# Patient Record
Sex: MALE | Race: WHITE | NOT HISPANIC OR LATINO | Employment: OTHER | URBAN - NONMETROPOLITAN AREA
[De-identification: names, ages, dates, MRNs, and addresses within clinical notes are randomized per-mention and may not be internally consistent; named-entity substitution may affect disease eponyms.]

---

## 2022-12-25 PROBLEM — I10 ESSENTIAL HYPERTENSION: Status: ACTIVE | Noted: 2022-12-25

## 2022-12-25 PROBLEM — J18.9 MULTIFOCAL PNEUMONIA: Status: ACTIVE | Noted: 2022-12-25

## 2022-12-25 PROBLEM — J96.01 ACUTE HYPOXEMIC RESPIRATORY FAILURE (HCC): Status: ACTIVE | Noted: 2022-12-25

## 2022-12-25 PROBLEM — A41.9 SEVERE SEPSIS (HCC): Status: ACTIVE | Noted: 2022-12-25

## 2022-12-25 PROBLEM — J43.9 EMPHYSEMA LUNG (HCC): Status: ACTIVE | Noted: 2022-12-25

## 2022-12-25 PROBLEM — R65.20 SEVERE SEPSIS (HCC): Status: ACTIVE | Noted: 2022-12-25

## 2022-12-26 PROBLEM — G47.33 OSA (OBSTRUCTIVE SLEEP APNEA): Status: ACTIVE | Noted: 2022-12-26

## 2022-12-26 PROBLEM — E83.52 HYPERCALCEMIA: Status: ACTIVE | Noted: 2022-12-26

## 2022-12-26 PROBLEM — R74.01 TRANSAMINITIS: Status: ACTIVE | Noted: 2022-12-26

## 2022-12-27 PROBLEM — I25.10 CORONARY ARTERY CALCIFICATION SEEN ON CT SCAN: Status: ACTIVE | Noted: 2022-12-27

## 2023-01-11 PROBLEM — N52.9 IMPOTENCE: Status: RESOLVED | Noted: 2017-09-19 | Resolved: 2023-01-11

## 2023-01-11 PROBLEM — Z87.01 HISTORY OF RECENT PNEUMONIA: Status: ACTIVE | Noted: 2023-01-11

## 2023-01-11 PROBLEM — G47.33 OBSTRUCTIVE SLEEP APNEA: Status: ACTIVE | Noted: 2022-06-01

## 2023-02-01 ENCOUNTER — HOSPITAL ENCOUNTER (OUTPATIENT)
Dept: PULMONOLOGY | Facility: HOSPITAL | Age: 63
Discharge: HOME | End: 2023-02-01
Attending: INTERNAL MEDICINE

## 2023-02-01 DIAGNOSIS — J43.2 CENTRILOBULAR EMPHYSEMA (HCC): ICD-10-CM

## 2023-02-01 RX ORDER — ALBUTEROL SULFATE 2.5 MG/3ML
2.5 SOLUTION RESPIRATORY (INHALATION) ONCE AS NEEDED
Status: COMPLETED | OUTPATIENT
Start: 2023-02-01 | End: 2023-02-01

## 2023-02-01 RX ADMIN — ALBUTEROL SULFATE 2.5 MG: 2.5 SOLUTION RESPIRATORY (INHALATION) at 16:42

## 2023-02-03 ENCOUNTER — HOSPITAL ENCOUNTER (EMERGENCY)
Facility: HOSPITAL | Age: 63
Discharge: HOME | End: 2023-02-03
Attending: EMERGENCY MEDICINE

## 2023-02-03 VITALS
TEMPERATURE: 98.3 F | RESPIRATION RATE: 18 BRPM | OXYGEN SATURATION: 97 % | BODY MASS INDEX: 28.75 KG/M2 | HEIGHT: 72 IN | HEART RATE: 75 BPM | SYSTOLIC BLOOD PRESSURE: 130 MMHG | DIASTOLIC BLOOD PRESSURE: 94 MMHG

## 2023-02-03 DIAGNOSIS — R04.0 RIGHT-SIDED EPISTAXIS: Primary | ICD-10-CM

## 2023-02-03 RX ORDER — TRANEXAMIC ACID 100 MG/ML
500 INJECTION, SOLUTION INTRAVENOUS ONCE
Status: COMPLETED | OUTPATIENT
Start: 2023-02-03 | End: 2023-02-03

## 2023-02-03 RX ADMIN — TRANEXAMIC ACID 500 MG: 1 INJECTION, SOLUTION INTRAVENOUS at 18:05

## 2023-02-03 NOTE — ED PROVIDER NOTES
History  Chief Complaint   Patient presents with   • Nose Bleed     Has nose bleed everyday, today he couldn't get to stop  Stopped in waiting room about 10 minutes ago     50-year-old male with history of  multifocal pneumonia admitted from - concern about interstitial lung disease and rheumatoid arthritis previously on methotrexate and Medrol Dosepak which has been held  He is currently on 2 L nasal cannula chronically since hospitalization presents with complaints of nosebleeds mostly right-sided  He did this problem does precede his hospitalization but is essentially had on a daily basis since discharge  He basically has spots on Kleenex that he places in his nose he has a known history of a perforated nasal septum this was from overuse of over-the-counter nasal spray  And 1 day he noticed that a Q-tip went completely through from 1 side of the nose to the other he had 2 ENT visits he underwent a scope of his larynx they were concerned about a spot there he completed some antibiotics and then presented for his pneumonia soon after he has not been able to follow-up with ENT since  His only anticoagulant is a baby aspirin  He has been using some use of pros and but he feels like the crustiness that makes the crustiness to the soft he denies any fever or chills  There is no clots no blood running down the back of his throat it is all right-sided he did not injure his nose or bump it he feels his dyspnea on exertion and breathing is at baseline  Prior to Admission Medications   Prescriptions Last Dose Informant Patient Reported? Taking? Azelastine-Fluticasone 137-50 MCG/ACT SUSP   Yes No   Si spray into each nostril every 12 (twelve) hours   amLODIPine (NORVASC) 10 mg tablet   Yes No   Sig: Take 10 mg by mouth daily in the early morning     aspirin (ECOTRIN LOW STRENGTH) 81 mg EC tablet   No No   Sig: Take 1 tablet (81 mg total) by mouth daily Do not start before 2023  clotrimazole-betamethasone (LOTRISONE) 1-0 05 % cream   No No   Sig: Apply topically 2 (two) times a day   co-enzyme Q-10 30 MG capsule   Yes No   Sig: Take 30 mg by mouth daily after lunch   folic acid (FOLVITE) 1 mg tablet   Yes No   Sig: Take 1 mg by mouth daily   gabapentin (NEURONTIN) 300 mg capsule   No No   Sig: Take 2 capsules (600 mg total) by mouth 2 (two) times a day   ipratropium-albuterol (DUO-NEB) 0 5-2 5 mg/3 mL nebulizer solution   No No   Sig: Take 3 mL by nebulization 3 (three) times a day as needed for wheezing or shortness of breath   lisinopril (ZESTRIL) 5 mg tablet   No No   Sig: Take 1 tablet (5 mg total) by mouth daily Do not start before January 5, 2023  methotrexate 2 5 mg tablet   Yes No   Sig: Take 2 5 mg by mouth once a week    methylprednisolone (MEDROL) 4 mg tablet   Yes No   Sig: Take 4 mg by mouth daily with breakfast   mupirocin (BACTROBAN) 2 % ointment   No No   Sig: Apply topically 2 (two) times a day   omeprazole (PriLOSEC) 40 MG capsule   Yes No   Sig: Take 40 mg by mouth daily with lunch   oxygen gas   Yes No   Sig: Inhale 3 L/min continuous   Indications: Oxygen Therapy   saccharomyces boulardii (FLORASTOR) 250 mg capsule   Yes No   Sig: Take 250 mg by mouth daily at bedtime   triamcinolone (KENALOG) 0 1 % cream   No No   Sig: Apply topically 2 (two) times a day      Facility-Administered Medications: None       Past Medical History:   Diagnosis Date   • Arthritis 2003   • Bladder cancer ([place] [address] )    • BPH (benign prostatic hyperplasia)    • Cancer ([place] [address] ) 2011    Bladder cancer   • COPD (chronic obstructive pulmonary disease) ([place] [address] ) 06/2020    Obstructive sleep apnoea   • CPAP (continuous positive airway pressure) dependence    • GERD (gastroesophageal reflux disease)    • HL (hearing loss)    • Hypertension    • Nasal congestion    • Neuropathy    • Rheumatoid arthritis ([place] [address] )    • Sleep apnea    • Sleep difficulties    • Tinnitus    • Umbilical hernia        Past Surgical History:   Procedure Laterality Date   • COLONOSCOPY     • CYSTOSCOPY      with bladder biopsy and resection of large bladder tumor- Dr Radha Cochran    • CYSTOSCOPY  11/09/2012, 08/03/2012, 06/04/2013, 12/21/2015, 6/23/2016, 1/20/2017, 2/9/2018    Diagnostic- Dr Radha Cochran    • ESOPHAGOGASTRODUODENOSCOPY     • KNEE SURGERY Right    • TN ARTHRS KNE SURG W/MENISCECTOMY MED/LAT W/SHVG Left 10/19/2016    Procedure: KNEE ARTHROSCOPIC PARTIAL MEDIAL MENISCECTOMY ;  Surgeon: Uziel Che MD;  Location: [facility];  Service: Orthopedics   • TN CORRECTION HAMMERTOE Left 04/01/2022    Procedure: REPAIR HAMMERTOE RIGHT 3RD TOE;  Surgeon: Mike Jackson DPM;  Location: [address] [place] [address] MAIN OR;  Service: Podiatry   • TN EXC B9 LESION MRGN XCP SK TG T/A/L 2 1-3 0 CM Bilateral 11/01/2018    Procedure: LEFT PROXIMAL FOREARM EXCISIONAL BIOPSY OF SUBCUTANEOUS SOFT TISSUE MASS; BILATERAL OLECRANON BURSECTOMY;  Surgeon: Uziel Che MD;  Location: [facility];  Service: Orthopedics   • TN EXC NEUROMA CUTAN NRV SURGLY IDENTIFIABLE Left 04/01/2022    Procedure: [address] RIGHT 2ND INTERSPACE;  Surgeon: Mike Jackson DPM;  Location: [address] [place] [address] MAIN OR;  Service: Podiatry   • TN TRURL ELECTROSURG [address] COMPLETE N/A 10/05/2018    Procedure: TRANSURETHRAL RESECTION OF PROSTATE (TURP); Surgeon: Aracelis Alexis MD;  Location: [facility];  Service: Urology   • UMBILICAL HERNIA REPAIR     • WISDOM TOOTH EXTRACTION         Family History   Problem Relation Age of Onset   • Heart disease Mother    • Diabetes Father    • Gout Brother    • Heart disease Brother    • Cancer Paternal Grandfather    • Cancer Maternal Uncle      I have reviewed and agree with the history as documented      E-Cigarette/Vaping   • E-Cigarette Use Never User      E-Cigarette/Vaping Substances   • Nicotine No    • THC No    • CBD No    • Flavoring No    • Other No    • Unknown No      Social History     Tobacco Use   • Smoking status: Former     Packs/day: 1 00     Years: 35 00     Pack years: 35 00     Types: Cigarettes     Quit date:      Years since quittin 0   • Smokeless tobacco: Never   Vaping Use   • Vaping Use: Never used   Substance Use Topics   • Alcohol use: Yes     Alcohol/week: 3 0 standard drinks     Types: 1 Glasses of wine, 1 Cans of beer, 1 Standard drinks or equivalent per week     Comment: beer while watching sports, wine sometimes with dinner   • Drug use: No       Review of Systems   Constitutional: Negative for activity change, appetite change, chills and fever  HENT: Positive for nosebleeds  Negative for congestion, ear pain, rhinorrhea, sneezing and sore throat  Eyes: Negative for discharge  Respiratory: Positive for shortness of breath (at baseline)  Negative for cough  Cardiovascular: Negative for chest pain and leg swelling  Gastrointestinal: Negative for abdominal pain, blood in stool, diarrhea, nausea and vomiting  Endocrine: Negative for polyuria  Musculoskeletal: Negative for back pain and myalgias  Skin: Negative for rash  Neurological: Negative for dizziness, numbness and headaches  Hematological: Negative for adenopathy  Psychiatric/Behavioral: Negative for confusion  All other systems reviewed and are negative  Physical Exam  Physical Exam  Vitals and nursing note reviewed  Constitutional:       General: He is not in acute distress  Appearance: He is not ill-appearing, toxic-appearing or diaphoretic  HENT:      Head: Normocephalic  Comments: No tenderness with palpation of the sinuses  Right Ear: Tympanic membrane normal       Left Ear: Tympanic membrane normal       Nose:      Comments: Abnormal anatomy with the absence of the nasal septum  Patient has no active bleeding on examination utilizing a speculum  Patient has dried blood throughout the nostrils but no clots  At the base of the right nasal opening there is a small ulceration  It is not actively bleeding       Mouth/Throat:      Mouth: Mucous membranes are moist       Pharynx: No oropharyngeal exudate or posterior oropharyngeal erythema  Comments: No blood seen tracking posteriorly  Eyes:      General:         Right eye: No discharge  Left eye: No discharge  Extraocular Movements: Extraocular movements intact  Conjunctiva/sclera: Conjunctivae normal       Pupils: Pupils are equal, round, and reactive to light  Cardiovascular:      Rate and Rhythm: Normal rate and regular rhythm  Pulmonary:      Effort: No respiratory distress  Breath sounds: No stridor  No wheezing, rhonchi or rales  Comments: 97% on 2L NC  Distant BS  Chest:      Chest wall: No tenderness  Abdominal:      General: Bowel sounds are normal  There is no distension  Tenderness: There is no abdominal tenderness  There is no right CVA tenderness, left CVA tenderness or guarding  Musculoskeletal:      Cervical back: Normal range of motion and neck supple  Lymphadenopathy:      Cervical: No cervical adenopathy  Neurological:      General: No focal deficit present  Mental Status: He is alert  Cranial Nerves: No cranial nerve deficit  Sensory: No sensory deficit  Motor: No weakness        Coordination: Coordination normal    Psychiatric:         Mood and Affect: Mood normal          Vital Signs  ED Triage Vitals   Temperature Pulse Respirations Blood Pressure SpO2   02/03/23 1636 02/03/23 1619 02/03/23 1619 02/03/23 1619 02/03/23 1619   98 3 °F (36 8 °C) 75 18 130/94 97 %      Temp Source Heart Rate Source Patient Position - Orthostatic VS BP Location FiO2 (%)   02/03/23 1636 02/03/23 1619 02/03/23 1619 02/03/23 1619 --   Temporal Monitor Lying Right arm       Pain Score       02/03/23 1619       No Pain           Vitals:    02/03/23 1619   BP: 130/94   Pulse: 75   Patient Position - Orthostatic VS: Lying         Visual Acuity      ED Medications  Medications   tranexamic acid 100mg/mL (for epistaxis) 500 mg (500 mg Nasal Given 2/3/23 1805)       Diagnostic Studies  Results Reviewed     None                 No orders to display              Procedures  Procedures         ED Course  ED Course as of 02/03/23 2243   [facility] Feb 03, 2023   1724 Attempted to place surgicell to irritated area but would not stick secondary to scantness of bleeding at base of right nostril   1753 Atomized 500mg of TXA to bilateral nostrils patient tolerated well   1824 No further bleeding patient has ENT appt tomorrow at 10a extensively reviewed D/C instruction reviewed no tissues up nose dab ; humified air with NC prongs cut off switch to aquaphor patient had tugging sensation the TXA has crusted in his nose hairs  If can't find aquaphor tonight ok to use muciprocin until recheck return with bleeding down back of the throat for both nostrils patient comfortable with plan                                             Medical Decision Making  Mdm: I recommended since his on a NC that the prongs be cut off until he has a change to f/u with ENT; this was demonstrated for on a new set of NC; attepted to place a piece of surgifoam but bleed at right base was too scant to adhere the surgifoam  Will atomize some TXA and recommend the use of aquaphor as it is nonocclusive; Do not recommend cautary secondary to abnormal anatomy and no clear source; patient does have a "bubbler" attachment to humidify the oxygen and recommended its use    Risk  Prescription drug management  Disposition  Final diagnoses:   Right-sided epistaxis     Time reflects when diagnosis was documented in both MDM as applicable and the Disposition within this note     Time User Action Codes Description Comment    2/3/2023  6:13 PM Pilo Soto Add [R04 0] Right-sided epistaxis       ED Disposition     ED Disposition   Discharge    Condition   Stable    Date/Time   Fri Feb 3, 2023  6:13 PM    [address] discharge to home/self care                 Follow-up Information Follow up With Specialties Details Why Contact Info    Francesca Fuentes MD Otolaryngology Go to  keep tomorrows appt [address]  [address] [address] [phone]            Discharge Medication List as of 2/3/2023  6:16 PM      CONTINUE these medications which have NOT CHANGED    Details   amLODIPine (NORVASC) 10 mg tablet Take 10 mg by mouth daily in the early morning  , Historical Med      aspirin (ECOTRIN LOW STRENGTH) 81 mg EC tablet Take 1 tablet (81 mg total) by mouth daily Do not start before January 5, 2023 , Starting Thu 1/5/2023, Normal      Azelastine-Fluticasone 137-50 MCG/ACT SUSP 1 spray into each nostril every 12 (twelve) hours, Starting Mon 1/24/2022, Historical Med      clotrimazole-betamethasone (LOTRISONE) 1-0 05 % cream Apply topically 2 (two) times a day, Starting Tue 12/18/2018, Print      co-enzyme Q-10 30 MG capsule Take 30 mg by mouth daily after lunch, Historical Med      folic acid (FOLVITE) 1 mg tablet Take 1 mg by mouth daily, Historical Med      gabapentin (NEURONTIN) 300 mg capsule Take 2 capsules (600 mg total) by mouth 2 (two) times a day, Starting Wed 1/4/2023, Normal      ipratropium-albuterol (DUO-NEB) 0 5-2 5 mg/3 mL nebulizer solution Take 3 mL by nebulization 3 (three) times a day as needed for wheezing or shortness of breath, Starting Wed 1/11/2023, Until Fri 2/10/2023 at 2359, Normal      lisinopril (ZESTRIL) 5 mg tablet Take 1 tablet (5 mg total) by mouth daily Do not start before January 5, 2023 , Starting Thu 1/5/2023, Normal      methotrexate 2 5 mg tablet Take 2 5 mg by mouth once a week , Starting Mon 3/22/2021, Historical Med      methylprednisolone (MEDROL) 4 mg tablet Take 4 mg by mouth daily with breakfast, Historical Med      mupirocin (BACTROBAN) 2 % ointment Apply topically 2 (two) times a day, Starting Wed 12/14/2022, Normal      omeprazole (PriLOSEC) 40 MG capsule Take 40 mg by mouth daily with lunch, Historical Med      oxygen gas Inhale 3 L/min continuous  Indications: Oxygen Therapy, Historical Med      saccharomyces boulardii (FLORASTOR) 250 mg capsule Take 250 mg by mouth daily at bedtime, Historical Med      triamcinolone (KENALOG) 0 1 % cream Apply topically 2 (two) times a day, Starting Sun 12/4/2022, Normal             No discharge procedures on file      PDMP Review       Value Time User    PDMP Reviewed  Yes 4/1/2022  1:08 PM Jas Singleton DPM          ED Provider  Electronically Signed by           Hira Luo MD  02/03/23 0927

## 2023-02-03 NOTE — DISCHARGE INSTRUCTIONS
Use nasal canuala without the prongs - humidfied air  Use AQUAPHOR over the counter - breathable form of vasoline small dab base of nostrils twice daily  Dab the nose no tissues up the nose  Return with blood comeing out botth nostrils or down the back of the throat or any new or worsening symptoms

## 2023-02-06 DIAGNOSIS — J84.9 ILD (INTERSTITIAL LUNG DISEASE) (HCC): Primary | ICD-10-CM

## 2023-02-15 ENCOUNTER — TELEPHONE (OUTPATIENT)
Dept: CARDIAC REHAB | Facility: HOSPITAL | Age: 63
End: 2023-02-15

## 2023-02-17 ENCOUNTER — CLINICAL SUPPORT (OUTPATIENT)
Dept: PULMONOLOGY | Facility: HOSPITAL | Age: 63
End: 2023-02-17
Attending: INTERNAL MEDICINE

## 2023-02-17 DIAGNOSIS — J84.9 ILD (INTERSTITIAL LUNG DISEASE) (HCC): Primary | ICD-10-CM

## 2023-02-17 NOTE — PROGRESS NOTES
Pulmonary Rehabilitation Plan of Care   Initial Care Plan    Today's date: 2023   # of Exercise Sessions Completed: Evaluation   Patient name: Ricardo Latif      : 1960  Age: 60 y o  MRN: [id number]  Referring Physician: Yenni Moore MD  Pulmonologist: Sydnie Garcia MD  Provider: Zakia  Clinician: Angelica Fierro MS    Dx:   Encounter Diagnosis   Name Primary? • ILD (interstitial lung disease) ([facility] )      Date of onset: 22      SUMMARY OF PROGRESS:  Today is Miller's initial evaluation to begin Pulmonary Rehab  Patient does have a PFT on file, revealing a total lung capacity at 65%  Since their diagnosis, the patient has been experiencing increased dyspnea, decreased physical activity, increased fatigue, weakness and decreased oxygen saturation  They report dyspnea, weakness and dizziness when completing ADLs   The patient currently does follow a formal exercise program at home, including walking for at least 30 minutes outside taking rest breaks and walking on the treadmill at his house intermittently taking breaks  Pt reports the following physical limitations: patient stated he has RA and occasionally experiences flair ups  Patient did receive questionnaire forms and has them completed, but left them at home  Patient was instructed to return them to rehab on his first day  Patient reports excellent social/emotional support  Information to begin using [employer] was provided as well as contact information for counseling through [facility]  PHQ-9 score will be reassessed in 30 days  The patient is a former smoker, qt   Patient admits to 100% medication compliance  At rest, the patient rated dyspnea 1/10 with SpO2 98% on supplemental O2 2L/min via nasal canula  They completed an initial 6MWT, walking 775 ft on supplemental O2 2L/min via nasal canula  The patient’s rating of perceived dyspnea during the 6MWT was 5/10 with SpO2 94-97%    Patient took 1 rest breaks  Resting  /70 with appropriate hemodynamic response to exercise reaching 146/72  Patient will exercise on supplemental O2 2L/min via nasal canula  Education on smoking cessation, oxygen use, breathing techniques, pulmonary anatomy, exercise for the pulmonary patient, healthy eating, stress, and relaxation will be provided  Patient goals include: increase strength, improve functional capacity, decrease SOB, decrease reliance on O2, return to work part time without limitations, enjoy family time, complete ADLs without complications  Yoan Valentin will attend 24 exercise sessions, 2x/wk for 12-18 weeks beginning 2/22/23  Will progress patient as tolerated over the next 30 days  Patient stated he is continues to feel SOB with exertion  Patient stated he will take his O2 off around the house with ADLs, but will wear it when he goes up and down stairs and while he runs errands  Patient would like to return to work part time at the end of the month if his doctor approves him  Patient has goals of losing his O2  Patient was encouraged to keep walking for at least 30 minutes daily  Patient stated he was told he has a calcium build up and has follow ups appointments with cardiology  Medication compliance: Yes   Comments: Pt reports to be compliant with medications  Fall Risk: Low   Comments: Ambulates with a steady gait with no assist device    Smoking: Former user    RPD at Rest: 1/10  RPD with Exercise:  5/10    Assessment of progression of lung disease and functional status:    Patient did receive questionnaire forms and has them completed, but left them at home  Patient was instructed to return them to rehab on his first day      CAT: /40  Shortness of breath questionnaire: /120      EXERCISE ASSESSMENT and PLAN    Current Exercise Program in Rehab:       Frequency: 3 days/week   Supplement with home exercise 2+ days/wk as tolerated        Minutes: 35-40         METS: 2 0-2 5              SpO2: >90%              RPD: 4-6                     HR:  bpm   RPE: 4-5         Modalities: Treadmill, UBE and NuStep      Exercise Progression 30 Day Goals :    Frequency: 3 days/week    Supplement with home exercise 2+ days/wk as tolerated       Minutes: 45-50         METS: 2 5-3 0              SpO2: >90%              RPD: 0-3                     HR: 20-30 beats above RHR   RPE: 4-5        Modalities: Treadmill, Airdyne bike, UBE, Lifecycle, NuStep and Recumbent bike     Strength trainin-3 days / week   Modalities: Leg Press, Chest Press, Pull Downs and free weights    Oxygen Needs: supplemental O2 via nasal cannula @ 2L/min at rest and supplemental O2 via nasal cannula @2L/min with exercise  Oxygen Goal: Maintain SpO2>90% during exercise    Home Exercise: patient stated he walks outside when the weather is nice, patient walked for 2 hours while taking multiple breaks the other day due to SOB   Education: pursed lipped breathing, diaphragmatic breathing, fall risk using nasal canula tubing, relaxation breathing, home exercise, benefits of exercise for pulmonary disease, RPE scale and RPD scale    Goals: reduced dyspnea during exercise (0-3/10), improved exercise tolerance (max METs tolerated in pulmonary rehab), reduced number of COPD exacerbations, reduced RPD at rest, attend pulmonary rehab regularly and decrease sitting time at home  Progressing:  Reviewed Pt goals and determined plan of care, Patient will continue to initiate a walking program at home for at least 30 minutes daily  in the next 30 days    Plan: Titrate supplemental oxygen as needed to maintain SpO2>90% with exercise, learn to conserve energy with ADLs , practice breathing techniques 3x/day and reduce time sitting at home    Readiness to change: Preparation:  (Getting ready to change)       NUTRITION ASSESSMENT AND PLAN    Weight control:    Starting weight: 220   Current weight:   220    Diabetes: N/A    Goals:LDL <100, HDL >40, TRG <150, CHOL <200, cook without added fat or use vegetable oil/spray, eat 3 or more servings of whole grains a day, Eat 4-5 cups of fruits and vegetables daily, use olive or canola oil in baking, choose low sodium processed foods, eliminate butter and choose healthy snacks: light popcorn, plain pretzels  Education: heart healthy eating  low sodium diet  hydration  nutrition for  lipid management  wt  loss   healthy choices while dining out  portion control  Progressing:Reviewed Pt goals and determined plan of care, Patient will make dietary changes to see improvement in future lipid panel  in the next 30 days  Plan: Education class: Reading Food Labels, Education Class: Heart Healthy Eating, replace butter with soft spreads made with olive oil, canola or yogurt, replace refined grain bread with whole grain bread, replace unhealthy snacks with fruits & vegs, reduce portion sizes, eat fewer desserts and sweets, avoid processed foods, drink more water and learn how to read food labels  Readiness to change: Preparation:  (Getting ready to change)       PSYCHOSOCIAL ASSESSMENT AND PLAN     Patient did receive questionnaire forms and has them completed, but left them at home  Patient was instructed to return them to rehab on his first day      Emotional:  Depression assessment:  PHQ-9 =              Anxiety measure:  DIMITRY-7 =    Self-reported stress level: 3   Social support: Very Good    Goals:  Reduce perceived stress to 1-3/10, Physical Fitness in [place] Score < 3, Daily Activity in [place] Score < 3, Social Activities in [place] Score < 3, Increased interest in doing things, improved positive thoughts of well being, increased energy and stop worrying  Education: signs/sxs of depression, benefits of a positive support system and stress management techniques    Progressing:Reviewed Pt goals and determined plan of care, Patient will enjoy hobbies to help him relax, take time to oneself  in the next 30 days  Plan: Exercise, Spend time outdoors, Enjoy a hobby, Keep a positive mindset, Meet new people and Enjoy family  Readiness to change: Preparation:  (Getting ready to change)       OTHER CORE COMPONENTS     Tobacco:   Social History     Tobacco Use   Smoking Status Former   • Packs/day: 1 00   • Years: 35 00   • Pack years: 35 00   • Types: Cigarettes   • Quit date:    • Years since quittin 1   Smokeless Tobacco Never       Tobacco Use Intervention: Referral to tobacco expert:   N/A: Pt has a remote history of smoking    Blood pressure:    Restin/70   Exercise: 146/72    Goals: consistent BP < 130/80, reduced dietary sodium <2300mg, moderate intensity exercise >150 mins/wk and medication compliance  Education:  pathophysiology of pulmonary disease, control coughing, inspiratory muscle training, environmental triggers, nebulizer use and traveling with pulmonary disease  Progressing:Reviewed Pt goals and determined plan of care, Patient will monitor pulse oximetry at home to maintain appropriate levels  in the next 30 days  Plan: avoid places with second hand smoke, Avoid Processed foods, engage in regular exercise, eliminate salt shaker at the table, use salt substitutes and check labels for sodium content  Readiness to change: Preparation:  (Getting ready to change)     PULMONARY REHAB ASSESSMENT    Today's date: 2023  Patient name: Eulogio Ling     : 1960       MRN: [id number]  PCP: Ronan Quinones DO  Referring Physician: Leobardo Duncan MD  Pulmonologist: Cory Escobar MD    Dx: ILD      Date of onset: 22  Cultural needs: N/A    Weight:    Wt Readings from Last 1 Encounters:   23 96 2 kg (212 lb)      Height:   Ht Readings from Last 1 Encounters:   23 6' (1 829 m)     Medical History:   Past Medical History:   Diagnosis Date   • Arthritis    • Bladder cancer [facility])    • BPH (benign prostatic hyperplasia)    • Cancer ([facility] )     Bladder cancer   • COPD (chronic obstructive pulmonary disease) ([facility] ) 06/2020    Obstructive sleep apnoea   • CPAP (continuous positive airway pressure) dependence    • GERD (gastroesophageal reflux disease)    • HL (hearing loss)    • Hypertension    • Nasal congestion    • Neuropathy    • Rheumatoid arthritis ([facility] )    • Sleep apnea    • Sleep difficulties    • Tinnitus    • Umbilical hernia          Physical Limitations: patient stated he has RA and occasionally experiences flair ups      Oxygen needs: 2L O2 at rest and with exertion, CPAP at night     History of Toxic Exposure:  Chemicals  Dust  Second hand smoke    Risk Factors   Cholesterol: No  Smoking: Former user  HTN: Yes  DM: No  Obesity: Yes   Inactivity: No  Stress:  perceived  stress: 3/10   Stressors:current health condition    Goals for Stress Management:take time to oneself, enjoy family    Family History:  Family History   Problem Relation Age of Onset   • Heart disease Mother    • Diabetes Father    • Gout Brother    • Heart disease Brother    • Cancer Paternal Grandfather    • Cancer Maternal Uncle        Allergies: Wasp venom, American cockroach allergy skin test, Dog epithelium allergy skin test, and Rinvoq [upadacitinib]  ETOH:   Social History     Substance and Sexual Activity   Alcohol Use Yes   • Alcohol/week: 3 0 standard drinks   • Types: 1 Glasses of wine, 1 Cans of beer, 1 Standard drinks or equivalent per week    Comment: beer while watching sports, wine sometimes with dinner         Current Medications:   Current Outpatient Medications   Medication Sig Dispense Refill   • amLODIPine (NORVASC) 10 mg tablet Take 10 mg by mouth daily in the early morning       • aspirin (ECOTRIN LOW STRENGTH) 81 mg EC tablet Take 1 tablet (81 mg total) by mouth daily Do not start before January 5, 2023  30 tablet 0   • Azelastine-Fluticasone 137-50 MCG/ACT SUSP 1 spray into each nostril every 12 (twelve) hours     • clotrimazole-betamethasone (LOTRISONE) 1-0 05 % cream Apply topically 2 (two) times a day 30 g 0   • co-enzyme Q-10 30 MG capsule Take 30 mg by mouth daily after lunch     • folic acid (FOLVITE) 1 mg tablet Take 1 mg by mouth daily     • gabapentin (NEURONTIN) 300 mg capsule Take 2 capsules (600 mg total) by mouth 2 (two) times a day 120 capsule 0   • lisinopril (ZESTRIL) 5 mg tablet Take 1 tablet (5 mg total) by mouth daily Do not start before January 5, 2023  30 tablet 0   • methotrexate 2 5 mg tablet Take 2 5 mg by mouth once a week      • methylprednisolone (MEDROL) 4 mg tablet Take 4 mg by mouth daily with breakfast     • mupirocin (BACTROBAN) 2 % ointment Apply topically 2 (two) times a day 22 g 0   • omeprazole (PriLOSEC) 40 MG capsule Take 40 mg by mouth daily with lunch     • oxygen gas Inhale 3 L/min continuous  Indications: Oxygen Therapy     • saccharomyces boulardii (FLORASTOR) 250 mg capsule Take 250 mg by mouth daily at bedtime     • triamcinolone (KENALOG) 0 1 % cream Apply topically 2 (two) times a day 30 g 0     No current facility-administered medications for this visit  Functional Status Prior to Diagnosis for Treatment   Occupation: retired, part time job  Recreation:  for [employer]   ADL’s: Capable of performing light ADLs only  [place]: Capable of performing light ADLs only  Exercise: walking  Other: N/A    Current Functional Status  Occupation: part time job   Recreation: see above  ADL’s:Capable of performing light ADLs only  [place]: Capable of performing light ADLs only  Exercise: walking  Other: N/A    Patient Specific Goals:  increase strength, improve functional capacity, decrease SOB, decrease reliance on O2, return to work part time without limitations, enjoy family time, complete ADLs without complications       Short Term Program Goals: dietary modifications increased strength improved energy/stamina with ADLs exercise 120-150 mins/wk    Long Term Goals: intial claudication time extended  increased maximal walking duration  increased intial training workload  Improved functional capacity  Improved lipid profile    Oxygen Goals: Maintain SpO2>90% titrating supplemental oxygen as needed     Ability to reach goals/rehabilitation potential:  Very Good     Projected return to function: 12 weeks  Objective tests: 6 MWT      Nutritional   Reviewed details of Rate your Plate  Discussed key elements of heart healthy eating  Reviewed patient goals for dietary modifications and their clinical implications  Reviewed most recent lipid profile  Goals for dietary modification: eliminate processed meats  increase whole grains  increase fruits and vegetables  eliminate butter  low sodium  improved snack choices      Emotional/Social  Patient reports he/she is coping well with good social support and denies depression or anxiety    SOCIAL SUPPORT NETWORK    Marital status: single      Domestic Violence Screening: No    Comments: Patient is a good candidate for pulmonary rehab

## 2023-02-19 ENCOUNTER — NURSE TRIAGE (OUTPATIENT)
Dept: OTHER | Facility: OTHER | Age: 63
End: 2023-02-19

## 2023-02-19 NOTE — TELEPHONE ENCOUNTER
Reason for Disposition  • Swollen ankle joint  (Exception: area of localized swelling which is itchy)    Answer Assessment - Initial Assessment Questions  1  LOCATION: "Which ankle is swollen?" "Where is the swelling?"      L ankle   2  ONSET: "When did the swelling start?"      1200  3  SIZE: "How large is the swelling?"      Just around the ankle   4  PAIN: "Is there any pain?" If Yes, ask: "How bad is it?" (Scale 1-10; or mild, moderate, severe)    - NONE (0): no pain  - MILD (1-3): doesn't interfere with normal activities  - MODERATE (4-7): interferes with normal activities (e g , work or school) or awakens from sleep, limping      - SEVERE (8-10): excruciating pain, unable to do any normal activities, unable to walk  Denies pain   5  CAUSE: "What do you think caused the ankle swelling?"      Unsure   6   OTHER SYMPTOMS: "Do you have any other symptoms?" (e g , fever, chest pain, difficulty breathing, calf pain)      Denies    Protocols used: ANKLE SWELLING-ADULT-

## 2023-02-19 NOTE — TELEPHONE ENCOUNTER
Patient calling thru pulmonary office about L ankle swelling  Has seen a decrease in the swelling after elevation  Denies injury/trauma  Swelling is unilateral just around ankle joint  Denies fever, redness  States his pulm status is at baseline, using o2 as prescribed  Looking for medical advise  Care advise given   instructed patient to also f/u with PCP

## 2023-02-19 NOTE — TELEPHONE ENCOUNTER
Regarding: question about ankle swelling  ----- Message from Lili  sent at 2/19/2023 12:52 PM EST -----  "I have a question about swelling in my ankle  I do not want to go to the hospital or urgent care   I just want to talk to someone who can answer a clinical question "

## 2023-02-22 ENCOUNTER — CLINICAL SUPPORT (OUTPATIENT)
Dept: PULMONOLOGY | Facility: HOSPITAL | Age: 63
End: 2023-02-22
Attending: INTERNAL MEDICINE

## 2023-02-22 DIAGNOSIS — J84.9 ILD (INTERSTITIAL LUNG DISEASE) (HCC): ICD-10-CM

## 2023-02-24 ENCOUNTER — CLINICAL SUPPORT (OUTPATIENT)
Dept: PULMONOLOGY | Facility: HOSPITAL | Age: 63
End: 2023-02-24
Attending: INTERNAL MEDICINE

## 2023-02-24 DIAGNOSIS — J84.9 INTERSTITIAL PNEUMONIA (HCC): ICD-10-CM

## 2023-02-24 PROBLEM — A41.9 SEVERE SEPSIS (HCC): Status: RESOLVED | Noted: 2022-12-25 | Resolved: 2023-02-24

## 2023-02-24 PROBLEM — R65.20 SEVERE SEPSIS (HCC): Status: RESOLVED | Noted: 2022-12-25 | Resolved: 2023-02-24

## 2023-02-24 PROBLEM — J18.9 MULTIFOCAL PNEUMONIA: Status: RESOLVED | Noted: 2022-12-25 | Resolved: 2023-02-24

## 2023-02-27 ENCOUNTER — CLINICAL SUPPORT (OUTPATIENT)
Dept: PULMONOLOGY | Facility: HOSPITAL | Age: 63
End: 2023-02-27
Attending: INTERNAL MEDICINE

## 2023-02-27 ENCOUNTER — HOSPITAL ENCOUNTER (OUTPATIENT)
Dept: CT IMAGING | Facility: HOSPITAL | Age: 63
Discharge: HOME | End: 2023-02-27
Attending: INTERNAL MEDICINE

## 2023-02-27 DIAGNOSIS — M05.79 SEROPOSITIVE RHEUMATOID ARTHRITIS OF MULTIPLE SITES (HCC): ICD-10-CM

## 2023-02-27 DIAGNOSIS — J43.2 CENTRILOBULAR EMPHYSEMA (HCC): ICD-10-CM

## 2023-02-27 DIAGNOSIS — J84.9 ILD (INTERSTITIAL LUNG DISEASE) (HCC): ICD-10-CM

## 2023-03-01 ENCOUNTER — TELEPHONE (OUTPATIENT)
Dept: CARDIAC REHAB | Facility: HOSPITAL | Age: 63
End: 2023-03-01

## 2023-03-01 ENCOUNTER — CLINICAL SUPPORT (OUTPATIENT)
Dept: PULMONOLOGY | Facility: HOSPITAL | Age: 63
End: 2023-03-01
Attending: INTERNAL MEDICINE

## 2023-03-01 ENCOUNTER — APPOINTMENT (OUTPATIENT)
Dept: LAB | Facility: HOSPITAL | Age: 63
End: 2023-03-01

## 2023-03-01 DIAGNOSIS — Z79.899 ENCOUNTER FOR LONG-TERM (CURRENT) USE OF OTHER MEDICATIONS: ICD-10-CM

## 2023-03-01 DIAGNOSIS — M05.79 SEROPOSITIVE RHEUMATOID ARTHRITIS OF MULTIPLE SITES (HCC): ICD-10-CM

## 2023-03-01 DIAGNOSIS — G47.19 EXCESSIVE DAYTIME SLEEPINESS: ICD-10-CM

## 2023-03-01 DIAGNOSIS — J84.9 ILD (INTERSTITIAL LUNG DISEASE) (HCC): ICD-10-CM

## 2023-03-01 LAB
ALBUMIN SERPL BCP-MCNC: 3.8 G/DL (ref 3.5–5)
ALP SERPL-CCNC: 53 U/L (ref 34–104)
ALT SERPL W P-5'-P-CCNC: 23 U/L (ref 7–52)
ANION GAP SERPL CALCULATED.3IONS-SCNC: 10 MMOL/L (ref 4–13)
AST SERPL W P-5'-P-CCNC: 21 U/L (ref 13–39)
BASOPHILS # BLD AUTO: 0.16 THOUSANDS/ÂΜL (ref 0–0.1)
BASOPHILS NFR BLD AUTO: 1 % (ref 0–1)
BILIRUB SERPL-MCNC: 0.59 MG/DL (ref 0.2–1)
BUN SERPL-MCNC: 15 MG/DL (ref 5–25)
CALCIUM SERPL-MCNC: 8.9 MG/DL (ref 8.4–10.2)
CHLORIDE SERPL-SCNC: 102 MMOL/L (ref 96–108)
CO2 SERPL-SCNC: 23 MMOL/L (ref 21–32)
CREAT SERPL-MCNC: 0.78 MG/DL (ref 0.6–1.3)
CRP SERPL QL: 18.8 MG/L
EOSINOPHIL # BLD AUTO: 0.22 THOUSAND/ÂΜL (ref 0–0.61)
EOSINOPHIL NFR BLD AUTO: 2 % (ref 0–6)
ERYTHROCYTE [DISTWIDTH] IN BLOOD BY AUTOMATED COUNT: 14.4 % (ref 11.6–15.1)
ERYTHROCYTE [SEDIMENTATION RATE] IN BLOOD: 50 MM/HOUR (ref 0–19)
GFR SERPL CREATININE-BSD FRML MDRD: 96 ML/MIN/1.73SQ M
GLUCOSE SERPL-MCNC: 126 MG/DL (ref 65–140)
HCT VFR BLD AUTO: 40.3 % (ref 36.5–49.3)
HGB BLD-MCNC: 13.2 G/DL (ref 12–17)
IMM GRANULOCYTES # BLD AUTO: 0.07 THOUSAND/UL (ref 0–0.2)
IMM GRANULOCYTES NFR BLD AUTO: 1 % (ref 0–2)
LYMPHOCYTES # BLD AUTO: 1.27 THOUSANDS/ÂΜL (ref 0.6–4.47)
LYMPHOCYTES NFR BLD AUTO: 11 % (ref 14–44)
MCH RBC QN AUTO: 31.3 PG (ref 26.8–34.3)
MCHC RBC AUTO-ENTMCNC: 32.8 G/DL (ref 31.4–37.4)
MCV RBC AUTO: 96 FL (ref 82–98)
MONOCYTES # BLD AUTO: 1.08 THOUSAND/ÂΜL (ref 0.17–1.22)
MONOCYTES NFR BLD AUTO: 9 % (ref 4–12)
NEUTROPHILS # BLD AUTO: 9.25 THOUSANDS/ÂΜL (ref 1.85–7.62)
NEUTS SEG NFR BLD AUTO: 76 % (ref 43–75)
NRBC BLD AUTO-RTO: 0 /100 WBCS
PLATELET # BLD AUTO: 275 THOUSANDS/UL (ref 149–390)
PMV BLD AUTO: 8.9 FL (ref 8.9–12.7)
POTASSIUM SERPL-SCNC: 3.7 MMOL/L (ref 3.5–5.3)
PROT SERPL-MCNC: 6.9 G/DL (ref 6.4–8.4)
RBC # BLD AUTO: 4.22 MILLION/UL (ref 3.88–5.62)
SODIUM SERPL-SCNC: 135 MMOL/L (ref 135–147)
WBC # BLD AUTO: 12.05 THOUSAND/UL (ref 4.31–10.16)

## 2023-03-03 ENCOUNTER — CLINICAL SUPPORT (OUTPATIENT)
Dept: PULMONOLOGY | Facility: HOSPITAL | Age: 63
End: 2023-03-03
Attending: INTERNAL MEDICINE

## 2023-03-03 DIAGNOSIS — J84.9 INTERSTITIAL LUNG DISEASE (HCC): ICD-10-CM

## 2023-03-06 ENCOUNTER — APPOINTMENT (OUTPATIENT)
Dept: PULMONOLOGY | Facility: HOSPITAL | Age: 63
End: 2023-03-06
Attending: INTERNAL MEDICINE

## 2023-03-07 ENCOUNTER — HOSPITAL ENCOUNTER (EMERGENCY)
Facility: HOSPITAL | Age: 63
Discharge: HOME | End: 2023-03-07
Attending: EMERGENCY MEDICINE

## 2023-03-07 VITALS
BODY MASS INDEX: 29.53 KG/M2 | HEIGHT: 72 IN | TEMPERATURE: 97.4 F | RESPIRATION RATE: 18 BRPM | DIASTOLIC BLOOD PRESSURE: 90 MMHG | WEIGHT: 218 LBS | HEART RATE: 104 BPM | SYSTOLIC BLOOD PRESSURE: 141 MMHG | OXYGEN SATURATION: 96 %

## 2023-03-07 DIAGNOSIS — K52.9 GASTROENTERITIS: Primary | ICD-10-CM

## 2023-03-07 LAB
ALBUMIN SERPL BCP-MCNC: 4 G/DL (ref 3.5–5)
ALP SERPL-CCNC: 41 U/L (ref 34–104)
ALT SERPL W P-5'-P-CCNC: 28 U/L (ref 7–52)
ANION GAP SERPL CALCULATED.3IONS-SCNC: 11 MMOL/L (ref 4–13)
AST SERPL W P-5'-P-CCNC: 24 U/L (ref 13–39)
BASOPHILS # BLD AUTO: 0.08 THOUSANDS/ÂΜL (ref 0–0.1)
BASOPHILS NFR BLD AUTO: 1 % (ref 0–1)
BILIRUB SERPL-MCNC: 0.82 MG/DL (ref 0.2–1)
BUN SERPL-MCNC: 21 MG/DL (ref 5–25)
CALCIUM SERPL-MCNC: 9.5 MG/DL (ref 8.4–10.2)
CHLORIDE SERPL-SCNC: 103 MMOL/L (ref 96–108)
CO2 SERPL-SCNC: 22 MMOL/L (ref 21–32)
CREAT SERPL-MCNC: 1.03 MG/DL (ref 0.6–1.3)
EOSINOPHIL # BLD AUTO: 0.31 THOUSAND/ÂΜL (ref 0–0.61)
EOSINOPHIL NFR BLD AUTO: 4 % (ref 0–6)
ERYTHROCYTE [DISTWIDTH] IN BLOOD BY AUTOMATED COUNT: 14.5 % (ref 11.6–15.1)
FLUAV RNA RESP QL NAA+PROBE: NEGATIVE
FLUBV RNA RESP QL NAA+PROBE: NEGATIVE
GFR SERPL CREATININE-BSD FRML MDRD: 76 ML/MIN/1.73SQ M
GLUCOSE SERPL-MCNC: 116 MG/DL (ref 65–140)
HCT VFR BLD AUTO: 45.3 % (ref 36.5–49.3)
HGB BLD-MCNC: 15.1 G/DL (ref 12–17)
IMM GRANULOCYTES # BLD AUTO: 0.02 THOUSAND/UL (ref 0–0.2)
IMM GRANULOCYTES NFR BLD AUTO: 0 % (ref 0–2)
LIPASE SERPL-CCNC: 26 U/L (ref 11–82)
LYMPHOCYTES # BLD AUTO: 1.79 THOUSANDS/ÂΜL (ref 0.6–4.47)
LYMPHOCYTES NFR BLD AUTO: 20 % (ref 14–44)
MCH RBC QN AUTO: 31.5 PG (ref 26.8–34.3)
MCHC RBC AUTO-ENTMCNC: 33.3 G/DL (ref 31.4–37.4)
MCV RBC AUTO: 94 FL (ref 82–98)
MONOCYTES # BLD AUTO: 1.36 THOUSAND/ÂΜL (ref 0.17–1.22)
MONOCYTES NFR BLD AUTO: 15 % (ref 4–12)
NEUTROPHILS # BLD AUTO: 5.35 THOUSANDS/ÂΜL (ref 1.85–7.62)
NEUTS SEG NFR BLD AUTO: 60 % (ref 43–75)
NRBC BLD AUTO-RTO: 0 /100 WBCS
PLATELET # BLD AUTO: 270 THOUSANDS/UL (ref 149–390)
PMV BLD AUTO: 9.1 FL (ref 8.9–12.7)
POTASSIUM SERPL-SCNC: 3.6 MMOL/L (ref 3.5–5.3)
PROT SERPL-MCNC: 7.1 G/DL (ref 6.4–8.4)
RBC # BLD AUTO: 4.8 MILLION/UL (ref 3.88–5.62)
RSV RNA RESP QL NAA+PROBE: NEGATIVE
SARS-COV-2 RNA RESP QL NAA+PROBE: NEGATIVE
SODIUM SERPL-SCNC: 136 MMOL/L (ref 135–147)
WBC # BLD AUTO: 8.91 THOUSAND/UL (ref 4.31–10.16)

## 2023-03-07 RX ORDER — ONDANSETRON 2 MG/ML
4 INJECTION INTRAMUSCULAR; INTRAVENOUS ONCE
Status: COMPLETED | OUTPATIENT
Start: 2023-03-07 | End: 2023-03-07

## 2023-03-07 RX ORDER — LOPERAMIDE HYDROCHLORIDE 2 MG/1
2 CAPSULE ORAL 4 TIMES DAILY PRN
Qty: 12 CAPSULE | Refills: 0 | Status: SHIPPED | OUTPATIENT
Start: 2023-03-07

## 2023-03-07 RX ORDER — ONDANSETRON 4 MG/1
4 TABLET, FILM COATED ORAL EVERY 6 HOURS
Qty: 12 TABLET | Refills: 0 | Status: SHIPPED | OUTPATIENT
Start: 2023-03-07

## 2023-03-07 RX ADMIN — SODIUM CHLORIDE 1000 ML: 0.9 INJECTION, SOLUTION INTRAVENOUS at 21:56

## 2023-03-07 RX ADMIN — ONDANSETRON 4 MG: 2 INJECTION INTRAMUSCULAR; INTRAVENOUS at 22:01

## 2023-03-08 ENCOUNTER — APPOINTMENT (OUTPATIENT)
Dept: PULMONOLOGY | Facility: HOSPITAL | Age: 63
End: 2023-03-08
Attending: INTERNAL MEDICINE

## 2023-03-08 NOTE — ED PROVIDER NOTES
Final Diagnosis:  1  Gastroenteritis        Chief Complaint   Patient presents with   • Diarrhea     Diarrhea since Sunday night/Monday morning; states 3 episodes of diarrhea today; been around family members with similar symptoms recently     HPI  Patient presents for evaluation of diarrhea  Patient states that multiple family members have had vomiting and diarrhea that sounds consistent with a gastroenteritis over the past 3 to 4 days  He states at that time his daughter-in-law, grandchildren, and son have all had the symptoms and they have lasted approximately 12 to 24 hours  He started with his symptoms on Sunday night  He states then they progressed through on Monday morning and then today he was feeling slightly better  He is able to tolerate 2 pieces of toast and some water  He then took had to cups of chicken broth and then had 3 episodes of diarrhea  He presents now for further evaluation and symptom control  No fever or chills  No bloody diarrhea  Patient does have a history of significant, recent pneumonia, CPAP, COPD and occasionally uses ambulatory oxygen after having significant pneumonia  Unless otherwise specified:  - No language barrier    - History obtained from patient  - There are no limitations to the history obtained  - Previous charting was reviewed    PMH:   has a past medical history of Arthritis (2003), Bladder cancer ([facility] ), BPH (benign prostatic hyperplasia), Cancer ([facility] ) (2011), COPD (chronic obstructive pulmonary disease) ([facility] ) (06/2020), CPAP (continuous positive airway pressure) dependence, GERD (gastroesophageal reflux disease), HL (hearing loss), Hypertension, Nasal congestion, Neuropathy, Rheumatoid arthritis ([facility] ), Sleep apnea, Sleep difficulties, Tinnitus, and Umbilical hernia  PSH:   has a past surgical history that includes Umbilical hernia repair; Knee surgery (Right);  [place] tooth extraction; Colonoscopy; Esophagogastroduodenoscopy; pr arthrs kne surg w/meniscectomy med/lat w/shvg (Left, 10/19/2016); Cystoscopy; CYSTOSCOPY (11/09/2012, 08/03/2012, 06/04/2013, 12/21/2015, 6/23/2016, 1/20/2017, 2/9/2018); pr trurl electrosurg rescj prostate bleed complete (N/A, 10/05/2018); pr exc b9 lesion mrgn xcp sk tg t/a/l 2 1-3 0 cm (Bilateral, 11/01/2018); pr exc neuroma cutan nrv surgly identifiable (Left, 04/01/2022); and pr correction hammertoe (Left, 04/01/2022)  ROS:  Review of Systems   - 13 point ROS was performed and all are normal unless stated in the history above  - Nursing note reviewed  Vitals reviewed  - Orders placed by myself and/or advanced practitioner / resident  PE:   Vitals:    03/07/23 2129   BP: 141/90   Pulse: 104   Resp: 18   Temp: (!) 97 4 °F (36 3 °C)   TempSrc: Temporal   SpO2: 96%   Weight: 98 9 kg (218 lb)   Height: 6' (1 829 m)     Vitals reviewed by me  Unless otherwise specified above:    General: VS reviewed  Appears in NAD    Head: Normocephalic, atraumatic  Eyes: EOM-I      ENT: Atraumatic external nose and ears  No drooling  Neck: No JVD  CV: No pallor noted  Lungs:   No tachypnea  No respiratory distress    Abdomen:  Soft, non-tender, non-distended    MSK:   No obvious deformity    Skin: Dry, intact  No obvious rash  Psychiatric/Behavioral: Appropriate mood and affect   Exam: deferred    Physical Exam     Procedures   A:  - Nursing note reviewed                        No orders to display     Orders Placed This Encounter   Procedures   • FLU/RSV/COVID - if FLU/RSV clinically relevant   • CBC and differential   • Comprehensive metabolic panel   • Lipase   • Insert peripheral IV     Labs Reviewed   CBC AND DIFFERENTIAL - Abnormal       Result Value Ref Range Status    WBC 8 91  4 31 - 10 16 Thousand/uL Final    RBC 4 80  3 88 - 5 62 Million/uL Final    Hemoglobin 15 1  12 0 - 17 0 g/dL Final    Hematocrit 45 3  36 5 - 49 3 % Final    MCV 94  82 - 98 fL Final    MCH 31 5  26 8 - 34 3 pg Final    MCHC 33 3  31 4 - 37 4 g/dL Final    RDW 14 5  11 6 - 15 1 % Final    MPV 9 1  8 9 - 12 7 fL Final    Platelets 684  536 - 390 Thousands/uL Final    nRBC 0  /100 WBCs Final    Neutrophils Relative 60  43 - 75 % Final    Immat GRANS % 0  0 - 2 % Final    Lymphocytes Relative 20  14 - 44 % Final    Monocytes Relative 15 (*) 4 - 12 % Final    Eosinophils Relative 4  0 - 6 % Final    Basophils Relative 1  0 - 1 % Final    Neutrophils Absolute 5 35  1 85 - 7 62 Thousands/µL Final    Immature Grans Absolute 0 02  0 00 - 0 20 Thousand/uL Final    Lymphocytes Absolute 1 79  0 60 - 4 47 Thousands/µL Final    Monocytes Absolute 1 36 (*) 0 17 - 1 22 Thousand/µL Final    Eosinophils Absolute 0 31  0 00 - 0 61 Thousand/µL Final    Basophils Absolute 0 08  0 00 - 0 10 Thousands/µL Final   COVID19, INFLUENZA A/B, RSV PCR, [facility] - Normal    SARS-CoV-2 Negative  Negative Final    INFLUENZA A PCR Negative  Negative Final    INFLUENZA B PCR Negative  Negative Final    RSV PCR Negative  Negative Final    Narrative:     FOR PEDIATRIC PATIENTS - copy/paste COVID Guidelines URL to browser: [email or web address]    SARS-CoV-2 assay is a Nucleic Acid Amplification assay intended for the  qualitative detection of nucleic acid from SARS-CoV-2 in nasopharyngeal  swabs  Results are for the presumptive identification of SARS-CoV-2 RNA  Positive results are indicative of infection with SARS-CoV-2, the virus  causing COVID-19, but do not rule out bacterial infection or co-infection  with other viruses  Laboratories within the [place] and its  territories are required to report all positive results to the appropriate  public health authorities  Negative results do not preclude SARS-CoV-2  infection and should not be used as the sole basis for treatment or other  patient management decisions   Negative results must be combined with  clinical observations, patient history, and epidemiological information  This test has not been FDA cleared or approved  This test has been authorized by FDA under an Emergency Use Authorization  (EUA)  This test is only authorized for the duration of time the  declaration that circumstances exist justifying the authorization of the  emergency use of an in vitro diagnostic tests for detection of SARS-CoV-2  virus and/or diagnosis of COVID-19 infection under section 564(b)(1) of  the Act, 21 U  S C  168FZB-4(A)(5), unless the authorization is terminated  or revoked sooner  The test has been validated but independent review by FDA  and CLIA is pending  Test performed using [employer] GeneXpert: This RT-PCR assay targets N2,  a region unique to SARS-CoV-2  A conserved region in the E-gene was chosen  for pan-Sarbecovirus detection which includes SARS-CoV-2  According to CMS-2020-01-R, this platform meets the definition of high[employer] technology  LIPASE - Normal    Lipase 26  11 - 82 u/L Final   COMPREHENSIVE METABOLIC PANEL    Sodium 641  135 - 147 mmol/L Final    Potassium 3 6  3 5 - 5 3 mmol/L Final    Chloride 103  96 - 108 mmol/L Final    CO2 22  21 - 32 mmol/L Final    ANION GAP 11  4 - 13 mmol/L Final    BUN 21  5 - 25 mg/dL Final    Creatinine 1 03  0 60 - 1 30 mg/dL Final    Comment: Standardized to IDMS reference method    Glucose 116  65 - 140 mg/dL Final    Comment: If the patient is fasting, the ADA then defines impaired fasting glucose as > 100 mg/dL and diabetes as > or equal to 123 mg/dL  Specimen collection should occur prior to Sulfasalazine administration due to the potential for falsely depressed results  Specimen collection should occur prior to Sulfapyridine administration due to the potential for falsely elevated results  Calcium 9 5  8 4 - 10 2 mg/dL Final    AST 24  13 - 39 U/L Final    Comment: Specimen collection should occur prior to Sulfasalazine administration due to the potential for falsely depressed results       ALT 28  7 - 52 U/L Final    Comment: Specimen collection should occur prior to Sulfasalazine administration due to the potential for falsely depressed results  Alkaline Phosphatase 41  34 - 104 U/L Final    Total Protein 7 1  6 4 - 8 4 g/dL Final    Albumin 4 0  3 5 - 5 0 g/dL Final    Total Bilirubin 0 82  0 20 - 1 00 mg/dL Final    eGFR 76  ml/min/1 73sq m Final    Narrative:     [place] guidelines for Chronic Kidney Disease (CKD):   •  Stage 1 with normal or high GFR (GFR > 90 mL/min/1 73 square meters)  •  Stage 2 Mild CKD (GFR = 60-89 mL/min/1 73 square meters)  •  Stage 3A Moderate CKD (GFR = 45-59 mL/min/1 73 square meters)  •  Stage 3B Moderate CKD (GFR = 30-44 mL/min/1 73 square meters)  •  Stage 4 Severe CKD (GFR = 15-29 mL/min/1 73 square meters)  •  Stage 5 End Stage CKD (GFR <15 mL/min/1 73 square meters)  Note: GFR calculation is accurate only with a steady state creatinine         Final Diagnosis:  1  Gastroenteritis        P:  -Patient appears nondistressed  He is tachycardic here likely indicating slight dehydration  Mucous membranes are moist   Given his age and risk factors including history of malignancy will provide labs to evaluate for dehydration and electrolyte abnormalities  Fluids here  Antiemetics as well  We will test for COVID as sometimes GI bugs are related to this  Low suspicion for acute abdominal pathology as patient is not having any abdominal pain and is otherwise appears well   -Laboratory analysis unremarkable  No obvious acute pathology at this time  Return precautions reviewed      Unless otherwise noted the patient's medications were reviewed and they should continue as directed      Medications   sodium chloride 0 9 % bolus 1,000 mL (0 mL Intravenous Stopped 3/7/23 2257)   ondansetron (ZOFRAN) injection 4 mg (4 mg Intravenous Given 3/7/23 2201)     Time reflects when diagnosis was documented in both MDM as applicable and the Disposition within this note Time User Action Codes Description Comment    3/7/2023 10:37 PM Padmini Contreras Add [K52 9] Gastroenteritis       ED Disposition     ED Disposition   Discharge    Condition   Stable    Date/Time   Tue Mar 7, 2023 10:37 PM    Comment   Nehalchucky Hernandez discharge to home/self care                 Follow-up Information     Follow up With Specialties Details Why Contact Info    Kojo Quinones DO Family Medicine   [address] [place] [address]   [phone]          Discharge Medication List as of 3/7/2023 10:39 PM      START taking these medications    Details   loperamide (IMODIUM) 2 mg capsule Take 1 capsule (2 mg total) by mouth 4 (four) times a day as needed for diarrhea, Starting Tue 3/7/2023, Normal      ondansetron (ZOFRAN) 4 mg tablet Take 1 tablet (4 mg total) by mouth every 6 (six) hours, Starting Tue 3/7/2023, Normal      psyllium (METAMUCIL SMOOTH TEXTURE) 28 % packet Take 1 packet by mouth 2 (two) times a day for 5 days, Starting Tue 3/7/2023, Until Sun 3/12/2023, Normal         CONTINUE these medications which have NOT CHANGED    Details   amLODIPine (NORVASC) 10 mg tablet Take 10 mg by mouth daily in the early morning  , Historical Med      aspirin (ECOTRIN LOW STRENGTH) 81 mg EC tablet Take 1 tablet (81 mg total) by mouth daily Do not start before January 5, 2023 , Starting Thu 1/5/2023, Normal      Azelastine-Fluticasone 137-50 MCG/ACT SUSP 1 spray into each nostril every 12 (twelve) hours, Starting Mon 1/24/2022, Historical Med      clotrimazole-betamethasone (LOTRISONE) 1-0 05 % cream Apply topically 2 (two) times a day, Starting Tue 12/18/2018, Print      co-enzyme Q-10 30 MG capsule Take 30 mg by mouth daily after lunch, Historical Med      folic acid (FOLVITE) 1 mg tablet Take 1 mg by mouth daily, Historical Med      gabapentin (NEURONTIN) 300 mg capsule Take 2 capsules (600 mg total) by mouth 2 (two) times a day, Starting Wed 1/4/2023, Normal      lisinopril (ZESTRIL) 5 mg tablet Take 1 tablet (5 mg total) by mouth daily Do not start before 2023 , Starting Thu 2023, Normal      methotrexate 2 5 mg tablet Take 2 5 mg by mouth once a week , Starting Mon 3/22/2021, Historical Med      methylprednisolone (MEDROL) 4 mg tablet Take 4 mg by mouth daily with breakfast, Historical Med      mupirocin (BACTROBAN) 2 % ointment Apply topically 2 (two) times a day, Starting Wed 2022, Normal      omeprazole (PriLOSEC) 40 MG capsule Take 40 mg by mouth daily with lunch, Historical Med      oxygen gas Inhale 3 L/min continuous  Indications: Oxygen Therapy, Historical Med      saccharomyces boulardii (FLORASTOR) 250 mg capsule Take 250 mg by mouth daily at bedtime, Historical Med      triamcinolone (KENALOG) 0 1 % cream Apply topically 2 (two) times a day, Starting Sun 2022, Normal           No discharge procedures on file  Prior to Admission Medications   Prescriptions Last Dose Informant Patient Reported? Taking? Azelastine-Fluticasone 137-50 MCG/ACT SUSP   Yes No   Si spray into each nostril every 12 (twelve) hours   amLODIPine (NORVASC) 10 mg tablet   Yes No   Sig: Take 10 mg by mouth daily in the early morning     aspirin (ECOTRIN LOW STRENGTH) 81 mg EC tablet   No No   Sig: Take 1 tablet (81 mg total) by mouth daily Do not start before 2023  clotrimazole-betamethasone (LOTRISONE) 1-0 05 % cream   No No   Sig: Apply topically 2 (two) times a day   co-enzyme Q-10 30 MG capsule   Yes No   Sig: Take 30 mg by mouth daily after lunch   folic acid (FOLVITE) 1 mg tablet   Yes No   Sig: Take 1 mg by mouth daily   gabapentin (NEURONTIN) 300 mg capsule   No No   Sig: Take 2 capsules (600 mg total) by mouth 2 (two) times a day   lisinopril (ZESTRIL) 5 mg tablet   No No   Sig: Take 1 tablet (5 mg total) by mouth daily Do not start before 2023     methotrexate 2 5 mg tablet   Yes No   Sig: Take 2 5 mg by mouth once a week    methylprednisolone (MEDROL) 4 mg tablet   Yes No   Sig: Take 4 mg by mouth daily with breakfast   mupirocin (BACTROBAN) 2 % ointment   No No   Sig: Apply topically 2 (two) times a day   omeprazole (PriLOSEC) 40 MG capsule   Yes No   Sig: Take 40 mg by mouth daily with lunch   oxygen gas   Yes No   Sig: Inhale 3 L/min continuous  Indications: Oxygen Therapy   saccharomyces boulardii (FLORASTOR) 250 mg capsule   Yes No   Sig: Take 250 mg by mouth daily at bedtime   triamcinolone (KENALOG) 0 1 % cream   No No   Sig: Apply topically 2 (two) times a day      Facility-Administered Medications: None       Portions of the record may have been created with voice recognition software  Occasional wrong word or "sound a like" substitutions may have occurred due to the inherent limitations of voice recognition software  Read the chart carefully and recognize, using context, where substitutions have occurred      Electronically signed by:  MD Jelena Storey MD  03/08/23 2316

## 2023-03-10 ENCOUNTER — CLINICAL SUPPORT (OUTPATIENT)
Dept: PULMONOLOGY | Facility: HOSPITAL | Age: 63
End: 2023-03-10
Attending: INTERNAL MEDICINE

## 2023-03-10 DIAGNOSIS — J84.9 ILD (INTERSTITIAL LUNG DISEASE) (HCC): Primary | ICD-10-CM

## 2023-03-10 NOTE — PROGRESS NOTES
Pulmonary Rehabilitation Plan of Care   30 Day Reassessment    Today's date: 3/10/2023   # of Exercise Sessions Completed:   Patient name: Moriah Cuevas      : 1960  Age: 60 y o  MRN: [id number]  Referring Physician: Princess Montague MD  Pulmonologist: Jozef Lagunas MD  Provider: Zakia  Clinician: Estephania Escamilla MS    Dx:   Encounter Diagnosis   Name Primary? 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  Patient stated his SOB has gotten better since starting rehab  Patient has been spending time with his son that he finally reconnected with after a few years  Patient rides his motorcycle for a stress reliever  Patient continues to have the goal of losing his O2 completely  Patient was encouraged to carry it with him just in case he needs it  Medication compliance: Yes   Comments: Pt reports to be compliant with medications  Fall Risk: Low   Comments: Ambulates with a steady gait with no assist device    Smoking: Former user    RPD at Rest: 1/10  RPD with Exercise:  5/10    Assessment of progression of lung disease and functional status:    Patient did receive questionnaire forms and has them completed, but left them at home  Patient was instructed to return them to rehab on his first day      CAT: /40  Shortness of breath questionnaire: /120      EXERCISE ASSESSMENT and PLAN    Current Exercise Program in Rehab:       Frequency: 3 days/week   Supplement with home exercise 2+ days/wk as tolerated        Minutes: 35-40         METS: 2 66-4 33              SpO2:90-94%              RPD: 0-3                     HR: 93-94 bpm   RPE: 4-5         Modalities: Treadmill, UBE and NuStep      Exercise Progression 30 Day Goals :    Frequency: 3 days/week    Supplement with home exercise 2+ days/wk as tolerated       Minutes: 45-50         METS: 3 5-4 5              SpO2: >90%              RPD: 0-3                     HR:  bpm   RPE: 4-5        Modalities: Treadmill, Airdyne bike, UBE, Lifecycle, NuStep and Recumbent bike     Strength trainin-3 days / week   Modalities: Leg Press, Chest Press, Pull Downs and free weights    Oxygen Needs: supplemental O2 via nasal cannula @ 2L/min at rest and supplemental O2 via nasal cannula @2L/min with exercise  Oxygen Goal: Maintain SpO2>90% during exercise    Home Exercise: patient stated he walks outside when the weather is nice, patient walked for 2 hours while taking multiple breaks the other day due to SOB   Education: pursed lipped breathing, diaphragmatic breathing, fall risk using nasal canula tubing, relaxation breathing, home exercise, benefits of exercise for pulmonary disease, RPE scale and RPD scale    Goals: reduced dyspnea during exercise (0-3/10), improved exercise tolerance (max METs tolerated in pulmonary rehab), reduced number of COPD exacerbations, reduced RPD at rest, attend pulmonary rehab regularly and decrease sitting time at home  Progressing:  Reviewed Pt goals and determined plan of care, Patient will continue to initiate a walking program at home for at least 30 minutes daily  in the next 30 days    Plan: Titrate supplemental oxygen as needed to maintain SpO2>90% with exercise, learn to conserve energy with ADLs , practice breathing techniques 3x/day and reduce time sitting at home    Readiness to change: Preparation:  (Getting ready to change)       NUTRITION ASSESSMENT AND PLAN    Weight control:    Starting weight: 220   Current weight:   218    Diabetes: N/A    Goals:LDL <100, HDL >40, TRG <150, CHOL <200, cook without added fat or use vegetable oil/spray, eat 3 or more servings of whole grains a day, Eat 4-5 cups of fruits and vegetables daily, use olive or canola oil in baking, choose low sodium processed foods, eliminate butter and choose healthy snacks: light popcorn, plain pretzels  Education: heart healthy eating  low sodium diet  hydration  nutrition for  lipid management  wt  loss   healthy choices while dining out  portion control  Progressing:Reviewed Pt goals and determined plan of care, Patient will make dietary changes to see improvement in future lipid panel  in the next 30 days  Plan: Education class: Reading Food Labels, Education Class: Heart Healthy Eating, replace butter with soft spreads made with olive oil, canola or yogurt, replace refined grain bread with whole grain bread, replace unhealthy snacks with fruits & vegs, reduce portion sizes, eat fewer desserts and sweets, avoid processed foods, drink more water and learn how to read food labels  Readiness to change: Preparation:  (Getting ready to change)       PSYCHOSOCIAL ASSESSMENT AND PLAN     Patient did receive questionnaire forms and has them completed, but left them at home  Patient was instructed to return them to rehab on his first day      Emotional:  Depression assessment:  PHQ-9 =              Anxiety measure:  DIMITRY-7 =    Self-reported stress level: 3   Social support: Very Good    Goals:  Reduce perceived stress to 1-3/10, Physical Fitness in [facility] Score < 3, Daily Activity in [facility] Score < 3, Social Activities in [facility] Score < 3, Increased interest in doing things, improved positive thoughts of well being, increased energy and stop worrying  Education: signs/sxs of depression, benefits of a positive support system and stress management techniques    Progressing:Reviewed Pt goals and determined plan of care, Patient will enjoy hobbies to help him relax, take time to oneself  in the next 30 days  Plan: Exercise, Spend time outdoors, Enjoy a hobby, Keep a positive mindset, Meet new people and Enjoy family  Readiness to change: Preparation:  (Getting ready to change)       OTHER CORE COMPONENTS     Tobacco:   Social History     Tobacco Use   Smoking Status Former   • Packs/day: 1 00   • Years: 35 00   • Pack years: 35 00   • Types: Cigarettes   • Quit date:    • Years since quittin    Smokeless Tobacco Never       Tobacco Use Intervention: Referral to tobacco expert:   N/A: Pt has a remote history of smoking    Blood pressure:    Restin/72   Exercise: 136/74    Goals: consistent BP < 130/80, reduced dietary sodium <2300mg, moderate intensity exercise >150 mins/wk and medication compliance  Education:  pathophysiology of pulmonary disease, control coughing, inspiratory muscle training, environmental triggers, nebulizer use and traveling with pulmonary disease  Progressing:Reviewed Pt goals and determined plan of care, Patient will monitor pulse oximetry at home to maintain appropriate levels  in the next 30 days  Plan: avoid places with second hand smoke, Avoid Processed foods, engage in regular exercise, eliminate salt shaker at the table, use salt substitutes and check labels for sodium content  Readiness to change: Preparation:  (Getting ready to change)

## 2023-03-13 ENCOUNTER — CLINICAL SUPPORT (OUTPATIENT)
Dept: PULMONOLOGY | Facility: HOSPITAL | Age: 63
End: 2023-03-13
Attending: INTERNAL MEDICINE

## 2023-03-13 DIAGNOSIS — J84.9 INTERSTITIAL PULMONARY DISEASE (HCC): ICD-10-CM

## 2023-03-15 ENCOUNTER — CLINICAL SUPPORT (OUTPATIENT)
Dept: PULMONOLOGY | Facility: HOSPITAL | Age: 63
End: 2023-03-15
Attending: INTERNAL MEDICINE

## 2023-03-15 DIAGNOSIS — J84.9 ILD (INTERSTITIAL LUNG DISEASE) (HCC): ICD-10-CM

## 2023-03-16 ENCOUNTER — HOSPITAL ENCOUNTER (OUTPATIENT)
Dept: NUCLEAR MEDICINE | Facility: HOSPITAL | Age: 63
Discharge: HOME | End: 2023-03-16
Attending: INTERNAL MEDICINE

## 2023-03-16 DIAGNOSIS — R06.09 DOE (DYSPNEA ON EXERTION): ICD-10-CM

## 2023-03-16 DIAGNOSIS — I25.10 CORONARY ARTERY CALCIFICATION SEEN ON CT SCAN: ICD-10-CM

## 2023-03-16 LAB
NUC STRESS EJECTION FRACTION: 63 %
RATE PRESSURE PRODUCT: NORMAL
SL CV REST NUCLEAR ISOTOPE DOSE: 10.2 MCI
SL CV STRESS NUCLEAR ISOTOPE DOSE: 31.9 MCI
SL CV STRESS RECOVERY BP: NORMAL MMHG
SL CV STRESS RECOVERY HR: 77 BPM
SL CV STRESS RECOVERY O2 SAT: 97 %
STRESS BASELINE BP: NORMAL MMHG
STRESS BASELINE HR: 68 BPM
STRESS O2 SAT REST: 96 %
STRESS PEAK HR: 91 BPM
STRESS POST O2 SAT PEAK: 98 %
STRESS POST PEAK BP: 120 MMHG
STRESS ST DEPRESSION: 0 MM
STRESS/REST PERFUSION RATIO: 0.96

## 2023-03-16 RX ADMIN — REGADENOSON 0.4 MG: 0.08 INJECTION, SOLUTION INTRAVENOUS at 10:25

## 2023-03-17 ENCOUNTER — CLINICAL SUPPORT (OUTPATIENT)
Dept: PULMONOLOGY | Facility: HOSPITAL | Age: 63
End: 2023-03-17
Attending: INTERNAL MEDICINE

## 2023-03-17 DIAGNOSIS — J84.9 INTERSTITIAL LUNG DISORDERS (HCC): Primary | ICD-10-CM

## 2023-03-20 ENCOUNTER — OFFICE VISIT (OUTPATIENT)
Dept: PULMONOLOGY | Facility: CLINIC | Age: 63
End: 2023-03-20

## 2023-03-20 ENCOUNTER — TELEPHONE (OUTPATIENT)
Dept: CARDIAC REHAB | Facility: HOSPITAL | Age: 63
End: 2023-03-20

## 2023-03-20 ENCOUNTER — CLINICAL SUPPORT (OUTPATIENT)
Dept: PULMONOLOGY | Facility: HOSPITAL | Age: 63
End: 2023-03-20
Attending: INTERNAL MEDICINE

## 2023-03-20 VITALS
OXYGEN SATURATION: 98 % | HEART RATE: 80 BPM | SYSTOLIC BLOOD PRESSURE: 129 MMHG | BODY MASS INDEX: 28.36 KG/M2 | DIASTOLIC BLOOD PRESSURE: 99 MMHG | HEIGHT: 73 IN | WEIGHT: 214 LBS | TEMPERATURE: 97.8 F

## 2023-03-20 DIAGNOSIS — G47.33 OBSTRUCTIVE SLEEP APNEA: ICD-10-CM

## 2023-03-20 DIAGNOSIS — J84.9 ILD (INTERSTITIAL LUNG DISEASE) (HCC): ICD-10-CM

## 2023-03-20 DIAGNOSIS — J96.11 CHRONIC RESPIRATORY FAILURE WITH HYPOXIA (HCC): ICD-10-CM

## 2023-03-20 DIAGNOSIS — J84.9 ILD (INTERSTITIAL LUNG DISEASE) (HCC): Primary | ICD-10-CM

## 2023-03-20 DIAGNOSIS — J43.9 PULMONARY EMPHYSEMA, UNSPECIFIED EMPHYSEMA TYPE (HCC): ICD-10-CM

## 2023-03-20 DIAGNOSIS — M05.79 SEROPOSITIVE RHEUMATOID ARTHRITIS OF MULTIPLE SITES (HCC): ICD-10-CM

## 2023-03-20 DIAGNOSIS — R93.89 ABNORMAL CT SCAN, CHEST: ICD-10-CM

## 2023-03-20 NOTE — PROGRESS NOTES
Patient showed up at rehab today wanting to be discharged  Pulmonary staff was waiting for him to be approved by his insurance for 2 more sessions  Patient stated he just wanted to be done without the hassle of waiting for approval  Patient wished to not complete 6MWT for discharge

## 2023-03-20 NOTE — ASSESSMENT & PLAN NOTE
Continue 2 L nasal cannula with activities  Maintain saturations greater than 88%  Continue pulmonary rehab

## 2023-03-20 NOTE — PROGRESS NOTES
Pulmonary Rehabilitation Plan of Care   Discharge    Today's date: 3/20/2023   # of Exercise Sessions Completed: 10/12  Patient name: Tony Russo      : 1960  Age: 60 y o  MRN: [id number]  Referring Physician: Chitra Boudreaux MD  Pulmonologist: Edy Murdock MD  Provider: Zakia  Clinician: Madisyn Morocho MS    Dx:   Encounter Diagnosis   Name Primary? • Interstitial lung disorders [facility])      Date of onset: 22      SUMMARY OF PROGRESS:  Patient has attended 10/12 sessions of pulmonary rehab  Patient was completing about 40 minutes of aerobic exercise and strength training  Resting /60, SOB 0/10, O2 Sat 95%  Exercise /60, SOB 2-3/10, O2 Sat 92-95%  Patient has not used oxygen at rehab since 3/10/23  Patient stated he was trying to get off of it, but was encouraged to spot check his pulse ox at home to be sure he was maintaining appropriate levels  Patient came into rehab today wanting to be discharged  Patient was waiting to be approved for 2 more sessions of rehab  Patient wished to not complete 6MWT for outcome results from his first days of rehab  Patient stated he is doing well and feels stronger and less SOB  Patient stated he has a treadmill at home and weights and will continue to get his exercise that way  Patient was given discharge papers and was encouraged to continue structured exercise  Patient is returning to work       Medication compliance: Yes   Comments: Pt reports to be compliant with medications  Fall Risk: Low   Comments: Ambulates with a steady gait with no assist device    Smoking: Former user    RPD at Rest: 0/10  RPD with Exercise:  2-3/10    Assessment of progression of lung disease and functional status:    CAT: 1840  Shortness of breath questionnaire: 51/120      EXERCISE ASSESSMENT and PLAN    Current Exercise Program in Rehab:       Frequency: 3 days/week   Supplement with home exercise 2+ days/wk as tolerated        Minutes: 35-40         METS: 3 78-4 0              SpO2:92-95%              RPD: 0-3                     HR: 85-99 bpm   RPE: 4-5         Modalities: Treadmill, UBE, Lifecycle and NuStep    Exercise Progression after Discharge:    Frequency: 3-5 days of gym or home exercise   Minutes: 30-50  >150 mins/wk of moderate intensity exercise   METS: 4 0-4 5   HR: 85 - 110 bpm    RPE: 4-6   Modalities: Treadmill, Airdyne bike, UBE, Lifecycle, NuStep and Room walking       Strength trainin-3 days / week   Modalities: Leg Press, Chest Press, Pull Downs and free weights    Oxygen Needs: supplemental O2 via nasal cannula @ 2L/min at rest and supplemental O2 via nasal cannula @2L/min with exercise  Oxygen Goal: Maintain SpO2>90% during exercise    Home Exercise: patient stated he walks outside when the weather is nice, patient walked for 2 hours while taking multiple breaks the other day due to SOB   Education: pursed lipped breathing, diaphragmatic breathing, fall risk using nasal canula tubing, relaxation breathing, home exercise, benefits of exercise for pulmonary disease, RPE scale and RPD scale    Goals: reduced dyspnea during exercise (0-3/10), improved exercise tolerance (max METs tolerated in pulmonary rehab), reduced number of COPD exacerbations, reduced RPD at rest, attend pulmonary rehab regularly and decrease sitting time at home  Progressing:  Reviewed Pt goals and determined plan of care, Patient will continue to initiate a walking program at home for at least 30 minutes daily  in the next 30 days    Plan: Titrate supplemental oxygen as needed to maintain SpO2>90% with exercise, learn to conserve energy with ADLs , practice breathing techniques 3x/day and reduce time sitting at home    Readiness to change: Action:  (Changing behavior)      NUTRITION ASSESSMENT AND PLAN    Weight control:    Starting weight: 220   Current weight:   218    Diabetes: N/A    Goals:LDL <100, HDL >40, TRG <150, CHOL <200, cook without added fat or use vegetable oil/spray, eat 3 or more servings of whole grains a day, Eat 4-5 cups of fruits and vegetables daily, use olive or canola oil in baking, choose low sodium processed foods, eliminate butter and choose healthy snacks: light popcorn, plain pretzels  Education: heart healthy eating  low sodium diet  hydration  nutrition for  lipid management  wt  loss   healthy choices while dining out  portion control  Progressing:Reviewed Pt goals and determined plan of care, Patient will make dietary changes to see improvement in future lipid panel  in the next 30 days  Plan: Education class: Reading Food Labels, Education Class: Heart Healthy Eating, replace butter with soft spreads made with olive oil, canola or yogurt, replace refined grain bread with whole grain bread, replace unhealthy snacks with fruits & vegs, reduce portion sizes, eat fewer desserts and sweets, avoid processed foods, drink more water and learn how to read food labels  Readiness to change: Action:  (Changing behavior)      PSYCHOSOCIAL ASSESSMENT AND PLAN    Emotional:  Depression assessment:  PHQ-9 =  6            Anxiety measure:  DIMITRY-7 =  6  Self-reported stress level: 3   Social support: Very Good    Goals:  Reduce perceived stress to 1-3/10, Physical Fitness in [facility] Score < 3, Daily Activity in [facility] Score < 3, Social Activities in [place] Score < 3, Increased interest in doing things, improved positive thoughts of well being, increased energy and stop worrying  Education: signs/sxs of depression, benefits of a positive support system and stress management techniques    Progressing:Reviewed Pt goals and determined plan of care, Patient will enjoy hobbies to help him relax, take time to oneself  in the next 30 days  Plan: Exercise, Spend time outdoors, Enjoy a hobby, Keep a positive mindset, Meet new people and Enjoy family  Readiness to change: Action:  (Changing behavior)      OTHER CORE COMPONENTS     Tobacco:   Social History     Tobacco Use   Smoking Status Former   • Packs/day: 1 00   • Years: 35 00   • Pack years: 35 00   • Types: Cigarettes   • Quit date:    • Years since quittin 2   Smokeless Tobacco Never       Tobacco Use Intervention: Referral to tobacco expert:   N/A: Pt has a remote history of smoking    Blood pressure:    Restin/60   Exercise: 130/60    Goals: consistent BP < 130/80, reduced dietary sodium <2300mg, moderate intensity exercise >150 mins/wk and medication compliance  Education:  pathophysiology of pulmonary disease, control coughing, inspiratory muscle training, environmental triggers, nebulizer use and traveling with pulmonary disease  Progressing:Reviewed Pt goals and determined plan of care, Patient will monitor pulse oximetry at home to maintain appropriate levels  in the next 30 days  Plan: avoid places with second hand smoke, Avoid Processed foods, engage in regular exercise, eliminate salt shaker at the table, use salt substitutes and check labels for sodium content  Readiness to change: Action:  (Changing behavior)

## 2023-03-20 NOTE — PROGRESS NOTES
Pulmonary Follow Up Note   Sona Villarreal 60 y o  male MRN: [id number]  3/20/2023      Assessment:    ILD (interstitial lung disease) [facility])  Reviewed results of high-resolution CT chest and pulmonary function tests with 6-minute walk today in the office  Overall findings are consistent with restrictive lung disease secondary to ILD most likely in the setting of rheumatoid arthritis  Reveals groundglass opacities are improved but some abnormalities persist in keeping with past history of pneumonia has now resolved  Given improving symptoms would continue to monitor  Plan to repeat CT chest without contrast and PFTs  6 months after previous testing due end of August 2023  Orders placed  If findings are persisting could consider stopping methotrexate after discussion with rheumatologist versus bronchoscopy consideration to steroid sparing agent  Obstructive sleep apnea  Continue CPAP during all hours of sleep  No compliance data for me to review today  Emphysema lung ([facility] [address] )  Noted emphysema on CT imaging  Continue DuoNeb nebulizer every 6 hours as needed  No fixed obstruction  Hold on maintenance inhalers  Chronic respiratory failure with hypoxia (HCC)  Continue 2 L nasal cannula with activities  Maintain saturations greater than 88%  Continue pulmonary rehab  Abnormal CT scan, chest  Reviewed with patient using PACS  Plan as above  Seropositive rheumatoid arthritis of multiple sites [facility])  Follows with rheumatology  Currently on methotrexate weekly and medrol 4 mg po daily  Plan:    Diagnoses and all orders for this visit:    ILD (interstitial lung disease) ([address] )  -     Complete PFT with post Bronchodilator and Six Minute walk; Future  -     CT chest wo contrast; Future    Abnormal CT scan, chest  Comments:  Evidence of emphysema    Mixed reticular and groundglass opacities are seen predominantly in the upper lobes and suggests pneumonia perhaps superimposed on more chronic changes although new since 2016  Patient does have history of rheumatoid arthritis  Mild mediastinal and hilar adenopathy could be reactive  A few scattered lung nodules are evident  These may be inflammatory  Some are stable  Follow-up is advised in 2-3 months time  Orders:  -     Ambulatory Referral to Pulmonology    Obstructive sleep apnea    Seropositive rheumatoid arthritis of multiple sites [facility])    Pulmonary emphysema, unspecified emphysema type ([facility] [address] )    Chronic respiratory failure with hypoxia ([facility] [address] )    Return to work paper work filled out at [employer] request     Return in about 6 months (around 9/20/2023)  History of Present Illness   HPI:  Lisa Shields is a 60 y o  male who presents the office today for routine follow-up  Patient has past medical is positive for rheumatoid arthritis on methotrexate, VIVIEN on CPAP, emphysema, abnormal CT chest following pneumonia with concerns of underlying ILD  Also has history of CAD, bladder cancer treated surgically  She was last seen in January  Since then he has not required antibiotics or been hospitalized worse related illness  Last hospitalization was 12/25-1/4 where he required high flow nasal cannula and was treated for multifocal pneumonia with sepsis requiring antibiotics and steroids discharged home on prednisone taper  Rheumatoid arthritis diagnosed in 2002 managed by rheumatology  Currently on methotrexate weekly, folic acid, methylprednisolone 4 mg daily  Symptoms have been stable  Presently patient reports overall improvement compared to hospitalization  He is actively dissipating in pulmonary rehab and does note improvement in dyspnea on exertion  Denies significant cough, sputum production, hemoptysis or wheeze  He does use nebulizer once a day does feel like it has at least some benefit  Denies chest pain or palpitations  Also tries to take walks at home    He notes less dyspnea on exertion with this to the point where he does not always carry his oxygen with him  SPO2 at rest ranges in the mid to high 90s but will drop into the 80s with ambulation without supplemental oxygen  Has a cat at home that does not bother her symptoms  Using CPAP during all hours of sleep  Former smoker with a quit date in 2011  Previously 45-pack-year smoking history  Answers for HPI/ROS submitted by the patient on 3/18/2023  Chronicity: chronic  When did you first notice your symptoms?: more than 1 month ago  How often do your symptoms occur?: rarely  Since you first noticed this problem, how has it changed?: gradually improving  Do you have shortness of breath that occurs with effort or exertion?: Yes  Do you have ear congestion?: No  Do you have heartburn?: Yes  Do you have fatigue?: No  Do you have nasal congestion?: Yes  Do you have shortness of breath when lying flat?: No  Do you have shortness of breath when you wake up?: No  Do you have sweats?: No  Have you experienced weight loss?: No  Which of the following makes your symptoms worse?: strenuous activity  Which of the following makes your symptoms better?: nothing      Review of Systems   Constitutional: Negative for appetite change and fever  HENT: Negative for ear pain, postnasal drip, rhinorrhea, sneezing, sore throat and trouble swallowing  Respiratory: Positive for shortness of breath  Cardiovascular: Negative for chest pain  Musculoskeletal: Negative for myalgias  Neurological: Negative for headaches  All other systems reviewed and are negative        Historical Information   Past Medical History:   Diagnosis Date   • Arthritis 2003   • Bladder cancer [facility])    • BPH (benign prostatic hyperplasia)    • Cancer ([facility] ) 2011    Bladder cancer   • COPD (chronic obstructive pulmonary disease) ([facility] ) 06/2020    Obstructive sleep apnoea   • CPAP (continuous positive airway pressure) dependence    • GERD (gastroesophageal reflux disease)    • HL (hearing loss)    • Hypertension    • Nasal congestion    • Neuropathy    • Rheumatoid arthritis ([facility] [address] )    • Sleep apnea    • Sleep difficulties    • Tinnitus    • Umbilical hernia      Past Surgical History:   Procedure Laterality Date   • COLONOSCOPY     • CYSTOSCOPY      with bladder biopsy and resection of large bladder tumor- Dr Yumiko Vora    • CYSTOSCOPY  11/09/2012, 08/03/2012, 06/04/2013, 12/21/2015, 6/23/2016, 1/20/2017, 2/9/2018    Diagnostic- Dr Yumiko Vora    • ESOPHAGOGASTRODUODENOSCOPY     • KNEE SURGERY Right    • NH ARTHRS KNE SURG W/MENISCECTOMY MED/LAT W/SHVG Left 10/19/2016    Procedure: KNEE ARTHROSCOPIC PARTIAL MEDIAL MENISCECTOMY ;  Surgeon: Tatyana Moreno MD;  Location: [facility];  Service: Orthopedics   • NH CORRECTION HAMMERTOE Left 04/01/2022    Procedure: REPAIR HAMMERTOE RIGHT 3RD TOE;  Surgeon: Jessica Foley DPM;  Location: [facility] MAIN OR;  Service: Podiatry   • NH EXC B9 LESION MRGN XCP SK TG T/A/L 2 1-3 0 CM Bilateral 11/01/2018    Procedure: LEFT PROXIMAL FOREARM EXCISIONAL BIOPSY OF SUBCUTANEOUS SOFT TISSUE MASS; BILATERAL OLECRANON BURSECTOMY;  Surgeon: Tatyana Moreno MD;  Location: [facility]  MAIN OR;  Service: Orthopedics   • NH EXC NEUROMA CUTAN NRV SURGLY IDENTIFIABLE Left 04/01/2022    Procedure: [address] RIGHT 2ND INTERSPACE;  Surgeon: Jessica Foley DPM;  Location: [facility] MAIN OR;  Service: Podiatry   • NH TRURL ELECTROSURG [address] COMPLETE N/A 10/05/2018    Procedure: TRANSURETHRAL RESECTION OF PROSTATE (TURP);   Surgeon: Anthony Montana MD;  Location: [facility] Main OR;  Service: Urology   • UMBILICAL HERNIA REPAIR     • WISDOM TOOTH EXTRACTION       Family History   Problem Relation Age of Onset   • Heart disease Mother    • Diabetes Father    • Gout Brother    • Heart disease Brother    • Cancer Paternal Grandfather    • Cancer Maternal Uncle        Social History     Tobacco Use   Smoking Status Former   • Packs/day: 1 00   • Years: 35 00   • Pack years: 35 00   • Types: Cigarettes   • Quit date: 2011   • Years since quittin 2   Smokeless Tobacco Never         Meds/Allergies     Current Outpatient Medications:   •  amLODIPine (NORVASC) 10 mg tablet, Take 10 mg by mouth daily in the early morning  , Disp: , Rfl:   •  aspirin (ECOTRIN LOW STRENGTH) 81 mg EC tablet, Take 1 tablet (81 mg total) by mouth daily Do not start before 2023 , Disp: 30 tablet, Rfl: 0  •  Azelastine-Fluticasone 137-50 MCG/ACT SUSP, 1 spray into each nostril every 12 (twelve) hours, Disp: , Rfl:   •  clotrimazole-betamethasone (LOTRISONE) 1-0 05 % cream, Apply topically 2 (two) times a day, Disp: 30 g, Rfl: 0  •  co-enzyme Q-10 30 MG capsule, Take 30 mg by mouth daily after lunch, Disp: , Rfl:   •  folic acid (FOLVITE) 1 mg tablet, Take 1 mg by mouth daily, Disp: , Rfl:   •  gabapentin (NEURONTIN) 300 mg capsule, Take 2 capsules (600 mg total) by mouth 2 (two) times a day, Disp: 120 capsule, Rfl: 0  •  lisinopril (ZESTRIL) 5 mg tablet, Take 1 tablet (5 mg total) by mouth daily Do not start before 2023 , Disp: 30 tablet, Rfl: 0  •  loperamide (IMODIUM) 2 mg capsule, Take 1 capsule (2 mg total) by mouth 4 (four) times a day as needed for diarrhea, Disp: 12 capsule, Rfl: 0  •  methotrexate 2 5 mg tablet, Take 2 5 mg by mouth once a week , Disp: , Rfl:   •  methylprednisolone (MEDROL) 4 mg tablet, Take 4 mg by mouth daily with breakfast, Disp: , Rfl:   •  mupirocin (BACTROBAN) 2 % ointment, Apply topically 2 (two) times a day, Disp: 22 g, Rfl: 0  •  omeprazole (PriLOSEC) 40 MG capsule, Take 40 mg by mouth daily with lunch, Disp: , Rfl:   •  ondansetron (ZOFRAN) 4 mg tablet, Take 1 tablet (4 mg total) by mouth every 6 (six) hours, Disp: 12 tablet, Rfl: 0  •  oxygen gas, Inhale 3 L/min continuous   Indications: Oxygen Therapy, Disp: , Rfl:   •  psyllium (METAMUCIL SMOOTH TEXTURE) 28 % packet, Take 1 packet by mouth 2 (two) times a day for 5 days (Patient not taking: Reported on 3/20/2023), Disp: 10 packet, Rfl: 0  •  saccharomyces boulardii (FLORASTOR) 250 mg capsule, Take 250 mg by mouth daily at bedtime, Disp: , Rfl:   •  triamcinolone (KENALOG) 0 1 % cream, Apply topically 2 (two) times a day, Disp: 30 g, Rfl: 0  Allergies   Allergen Reactions   • Wasp Venom Anaphylaxis   • American Cockroach Allergy Skin Test Other (See Comments)     Allergy test done   • Dog Epithelium Allergy Skin Test Other (See Comments)     Allergy test was done  Also allergic to cat dander     • Rinvoq [Upadacitinib] Dizziness       Vitals: Blood pressure 129/99, pulse 80, temperature 97 8 °F (36 6 °C), temperature source Tympanic, height 6' 1" (1 854 m), weight 97 1 kg (214 lb), SpO2 98 %  Body mass index is 28 23 kg/m²  Oxygen Therapy  SpO2: 98 %    Physical Exam  Physical Exam  Vitals reviewed  Constitutional:       Appearance: Normal appearance  He is well-developed  HENT:      Head: Normocephalic and atraumatic  Nose: Nose normal       Mouth/Throat:      Mouth: Mucous membranes are moist    Eyes:      Extraocular Movements: Extraocular movements intact  Cardiovascular:      Rate and Rhythm: Normal rate and regular rhythm  Heart sounds: Normal heart sounds  Pulmonary:      Effort: Pulmonary effort is normal  No respiratory distress  Breath sounds: No wheezing, rhonchi or rales  Musculoskeletal:         General: No swelling  Cervical back: Normal range of motion and neck supple  Skin:     General: Skin is warm and dry  Neurological:      General: No focal deficit present  Mental Status: He is alert  Mental status is at baseline  Psychiatric:         Mood and Affect: Mood normal          Behavior: Behavior normal          Labs: I have personally reviewed pertinent lab results  , ABG: No results found for: PHART, HFR4XKR, PO2ART, FUR5PEL, X3OMDTJU, BEART, SOURCE, BNP: No results found for: BNP, CBC: No results found for: WBC, HGB, HCT, MCV, PLT, ADJUSTEDWBC, MCH, MCHC, RDW, MPV, NRBC, CMP: No results found for: SODIUM, K, CL, CO2, ANIONGAP, BUN, CREATININE, GLUCOSE, CALCIUM, AST, ALT, ALKPHOS, PROT, BILITOT, EGFR, PT/INR: No results found for: PT, INR, Troponin: No results found for: TROPONINI  Lab Results   Component Value Date    WBC 8 91 03/07/2023    HGB 15 1 03/07/2023    HCT 45 3 03/07/2023    MCV 94 03/07/2023     03/07/2023     Lab Results   Component Value Date    GLUCOSE 112 11/30/2015    CALCIUM 9 5 03/07/2023     11/30/2015    K 3 6 03/07/2023    CO2 22 03/07/2023     03/07/2023    BUN 21 03/07/2023    CREATININE 1 03 03/07/2023     No results found for: IGE  Lab Results   Component Value Date    ALT 28 03/07/2023    AST 24 03/07/2023    ALKPHOS 41 03/07/2023    BILITOT 0 37 11/30/2015       Imaging and other studies: I have personally reviewed pertinent reports  and I have personally reviewed pertinent films in PACS     High-resolution CT chest 2/27/2023  Significant decrease in groundglass opacities compared to 12/25/2022  Residual peripheral groundglass opacities scattered throughout lungs persist   Associated prominence of interstitial lung markings, mild lung distortion, mild bronchiectasis in these regions  No honeycombing  1 cm thin-walled cyst in the right lower lobe is stable  Emphysema with centrilobular, panlobular and paraseptal components mostly upper lobe predominant  Few scattered pulmonary nodules measuring 5 mm in average are unchanged since 2016  Mildly enlarged left and right main pulmonary arteries stable  Mild cardiomegaly  Mild coronary artery calcification  Pulmonary function testing:  Performed 2/1/2023   FEV1/FVC ratio 84%   FEV1 68% predicted  FVC 62% predicted  no response to bronchodilators  TLC 65 % predicted  RV 83 % predicted  DLCO corrected for hemoglobin 39 % predicted  Restrictive pattern on spirometry based on vital capacity  No change with bronchodilators  Decreased lung volumes indicative of restrictive lung disease    Severely impaired diffusion capacity  6-minute walk demonstrated desaturations to 84% on room air with ambulation after 2 minutes  Required 2 L/min to continue the test   End of test heart rate was 112 bpm with SPO2 89% on 2 L  Patient ambulated 64 m

## 2023-03-20 NOTE — ASSESSMENT & PLAN NOTE
Reviewed results of high-resolution CT chest and pulmonary function tests with 6-minute walk today in the office  Overall findings are consistent with restrictive lung disease secondary to ILD most likely in the setting of rheumatoid arthritis  Reveals groundglass opacities are improved but some abnormalities persist in keeping with past history of pneumonia has now resolved  Given improving symptoms would continue to monitor  Plan to repeat CT chest without contrast and PFTs  6 months after previous testing due end of August 2023  Orders placed  If findings are persisting could consider stopping methotrexate after discussion with rheumatologist versus bronchoscopy consideration to steroid sparing agent

## 2023-03-20 NOTE — ASSESSMENT & PLAN NOTE
Noted emphysema on CT imaging  Continue DuoNeb nebulizer every 6 hours as needed  No fixed obstruction  Hold on maintenance inhalers

## 2023-03-21 ENCOUNTER — TELEPHONE (OUTPATIENT)
Dept: PULMONOLOGY | Facility: CLINIC | Age: 63
End: 2023-03-21

## 2023-03-21 LAB
CHEST PAIN STATEMENT: NORMAL
MAX DIASTOLIC BP: 70 MMHG
MAX HEART RATE: 91 BPM
MAX PREDICTED HEART RATE: 157 BPM
MAX. SYSTOLIC BP: 120 MMHG
PROTOCOL NAME: NORMAL
REASON FOR TERMINATION: NORMAL
TARGET HR FORMULA: NORMAL
TEST INDICATION: NORMAL
TIME IN EXERCISE PHASE: NORMAL

## 2023-03-21 NOTE — TELEPHONE ENCOUNTER
Patient calling asking if [facility] faxed the paperwork that he needs filled out from the doctor to his employer in order for him to return back to work  Please advise       Fax# [phone]

## 2023-10-27 ENCOUNTER — DOCUMENTATION (OUTPATIENT)
Dept: OBGYN CLINIC | Facility: CLINIC | Age: 63
End: 2023-10-27

## 2023-10-27 NOTE — PROGRESS NOTES
I called patient back.  Patient felt that brace was not as tight as yesterday.  I instructed to line up the unload pad at the joint and the tight the bottom straps and work up to the top.  The patient was my number to reach me if he has additional questions.

## 2024-10-29 PROBLEM — M87.051 AVASCULAR NECROSIS OF BONE OF RIGHT HIP (HCC): Status: ACTIVE | Noted: 2024-10-29

## 2024-11-04 ENCOUNTER — TELEPHONE (OUTPATIENT)
Dept: OBGYN CLINIC | Facility: HOSPITAL | Age: 64
End: 2024-11-04

## 2024-11-04 NOTE — TELEPHONE ENCOUNTER
"Patient called back for Postoperative Follow Up Call Assessment. Patient reports doing  \"the best I can\" and \"trying to do the exercises.\" He reports \"sore and swollen.\"  Patient states current pain level of a  5/10 while laying down resting, and increases with movement 8/10.  He is ambulating with the RW.  Pt states swelling is noticeable to the \"whole leg\" and we discussed postoperative swelling, continuing to ICE areas of pain/swelling, especially after increased activity over the next few weeks.      Pt was ordered HHC through [facility]. He was planning to call them this AM to confirm upcoming appt. He is staying with his Son, and will return to his home for [facility] Appt.     We reviewed patients AVS medication list. Patient is taking Tylenol 650mg every 8 hours, Oxycodone 5mg PRN, Lovenox daily,  and Colace 100mg BID. Patient had a BM yesterday, and we discussed taking the colace until having regular bowel movements.     Patient denies nausea, vomiting, abdominal pain, chest pain, shortness of breath, fever, dizziness and calf pain. Patient does not have any other questions or concerns at this time. Pt was encouraged to call with any questions, concerns or issues.    "

## 2024-11-06 ENCOUNTER — TELEPHONE (OUTPATIENT)
Age: 64
End: 2024-11-06

## 2024-11-06 NOTE — TELEPHONE ENCOUNTER
Spoke with patient. Patient reports increase in swelling. NN advised patient that he may experience episodes of increased swelling for at least 21 days following surgery. Advised patient to increase icing regimen.

## 2024-11-06 NOTE — TELEPHONE ENCOUNTER
Caller: Miller    Doctor/Office: Sandra    Call regarding :  Post OP swelling denies any warm or redness. Patient would like to know how long the swelling is going to last.  Please advise  Thank you     Call was transferred to: Nurse navigator

## 2024-11-07 ENCOUNTER — TELEPHONE (OUTPATIENT)
Age: 64
End: 2024-11-07

## 2024-11-07 ENCOUNTER — PATIENT OUTREACH (OUTPATIENT)
Dept: OBGYN CLINIC | Facility: HOSPITAL | Age: 64
End: 2024-11-07

## 2024-11-07 NOTE — PROGRESS NOTES
EBONIE contacted pt today to follow up after surgery. Per pt he is receiving in home nursing care from [facility]. Pt will also receive in home PT through [facility]. Pt reports no other needs at this time. [facility] will close referral and remain available as needed.

## 2024-11-11 NOTE — TELEPHONE ENCOUNTER
Caller: Patient    Doctor: Sandra    Reason for call: Questioned fax # for [employer]. Provided. Will be faxing fmla forms. Advised 10-14 bus days for completion. Provided instructions to upload to [employer]    Call back#: [phone]

## 2024-11-14 ENCOUNTER — OFFICE VISIT (OUTPATIENT)
Dept: OBGYN CLINIC | Facility: CLINIC | Age: 64
End: 2024-11-14

## 2024-11-14 ENCOUNTER — APPOINTMENT (OUTPATIENT)
Dept: RADIOLOGY | Facility: MEDICAL CENTER | Age: 64
End: 2024-11-14
Payer: COMMERCIAL

## 2024-11-14 VITALS
SYSTOLIC BLOOD PRESSURE: 138 MMHG | TEMPERATURE: 97.6 F | HEIGHT: 72 IN | BODY MASS INDEX: 27.54 KG/M2 | DIASTOLIC BLOOD PRESSURE: 78 MMHG | HEART RATE: 75 BPM | OXYGEN SATURATION: 97 %

## 2024-11-14 DIAGNOSIS — Z96.641 STATUS POST TOTAL REPLACEMENT OF RIGHT HIP: ICD-10-CM

## 2024-11-14 DIAGNOSIS — Z96.641 STATUS POST TOTAL REPLACEMENT OF RIGHT HIP: Primary | ICD-10-CM

## 2024-11-14 PROCEDURE — 99024 POSTOP FOLLOW-UP VISIT: CPT | Performed by: ORTHOPAEDIC SURGERY

## 2024-11-14 PROCEDURE — 73502 X-RAY EXAM HIP UNI 2-3 VIEWS: CPT

## 2024-11-14 NOTE — PROGRESS NOTES
ASSESSMENT/PLAN:    Diagnoses and all orders for this visit:    Status post total replacement of right hip  -     XR hip/pelv 2-3 vws right if performed; Future        X-rays the patient's right hip are consistent with a well-seated right total hip replacement.  The prosthesis is in good alignment.  There are no signs of loosening.  There are no fractures or dislocations.  The patient may transition to aspirin 325 mg daily for DVT prophylaxis.  He will follow-up with our office in 1 month for strength and motion check.  He may continue home physical therapy to work on strengthening, stretching and range of motion.  He is acceptable to this plan.    Return in about 1 month (around 12/14/2024).    The patient is doing quite well in regards to his right total hip replacement.  Incision clean and dry.  Leg lengths intact.  Tomorrow is his last day for injectables.  And then he will start aspirin daily.  Continue therapy.  Return back in 1 month for strength and motion check.  Continue hip precautions      _____________________________________________________  CHIEF COMPLAINT:  Chief Complaint   Patient presents with    Right Hip - Post-op         SUBJECTIVE:  Miller Baldwin is a 63 y.o. male who presents to our office for a postop visit.  The patient is status post right total hip replaced from 10/13/2024.  He is pleased with the results from surgery.  He is ambulating with a walker/cane.  He states that his right hip pain is continuing to improve.  He has been participating in home physical therapy.  He denies any numbness or tingling.  He denies any fever or chills.    The following portions of the patient's history were reviewed and updated as appropriate: allergies, current medications, past family history, past medical history, past social history, past surgical history and problem list.    PAST MEDICAL HISTORY:  Past Medical History:   Diagnosis Date    Arthritis 2003    Bladder cancer (HCC)     BPH (benign  prostatic hyperplasia)     Cancer ([facility]) 2011    Bladder cancer    COPD (chronic obstructive pulmonary disease) ([facility]) 06/2020    Obstructive sleep apnoea    CPAP (continuous positive airway pressure) dependence     GERD (gastroesophageal reflux disease)     HL (hearing loss)     Hypertension     Nasal congestion     Neuropathy     Pneumonia 12/22/2022    Rheumatoid arthritis ([facility])     Sleep apnea     Sleep difficulties     Tinnitus     Umbilical hernia        PAST SURGICAL HISTORY:  Past Surgical History:   Procedure Laterality Date    COLONOSCOPY      CYSTOSCOPY      with bladder biopsy and resection of large bladder tumor- Dr. Castro     CYSTOSCOPY  11/09/2012, 08/03/2012, 06/04/2013, 12/21/2015, 6/23/2016, 1/20/2017, 2/9/2018    Diagnostic- Dr. Castro     ESOPHAGOGASTRODUODENOSCOPY      KNEE SURGERY Right     OK ARTHRP ACETBLR/PROX FEM PROSTC AGRFT/ALGRFT Right 10/30/2024    Procedure: ARTHROPLASTY HIP TOTAL;  Surgeon: Alex Flores DO;  Location: [facility];  Service: Orthopedics    OK ARTHRS KNE SURG W/MENISCECTOMY MED/LAT W/SHVG Left 10/19/2016    Procedure: KNEE ARTHROSCOPIC PARTIAL MEDIAL MENISCECTOMY ;  Surgeon: Juni Shelley MD;  Location: [facility];  Service: Orthopedics    OK CORRECTION HAMMERTOE Left 04/01/2022    Procedure: REPAIR HAMMERTOE RIGHT 3RD TOE;  Surgeon: Abraham Perkins DPM;  Location:  MAIN OR;  Service: Podiatry    OK EXC B9 LESION MRGN XCP SK TG T/A/L 2.1-3.0 CM Bilateral 11/01/2018    Procedure: LEFT PROXIMAL FOREARM EXCISIONAL BIOPSY OF SUBCUTANEOUS SOFT TISSUE MASS; BILATERAL OLECRANON BURSECTOMY;  Surgeon: Juni Shelley MD;  [facility];  Service: Orthopedics    OK EXC NEUROMA CUTAN NRV SURGLY IDENTIFIABLE Left 04/01/2022    Procedure: EXCISION NEUROMA RIGHT 2ND INTERSPACE;  Surgeon: Abraham Perkins DPM;  Location:  MAIN OR;  Service: Podiatry    OK TRURL ELECTROSURG RESCJ PROSTATE BLEED COMPLETE N/A 10/05/2018    Procedure: TRANSURETHRAL RESECTION OF PROSTATE (TURP);  Surgeon:  Thierno Castro MD;  Location: [facility];  Service: Urology    PROSTATE SURGERY      UMBILICAL HERNIA REPAIR      WISDOM TOOTH EXTRACTION         FAMILY HISTORY:  Family History   Problem Relation Age of Onset    Heart disease Mother     Diabetes Father     Gout Brother     Heart disease Brother     Cancer Paternal Grandfather         Lung, bone marrow cancer    Cancer Maternal Uncle         Lung cancer ( mesothelioma), according to cousin       SOCIAL HISTORY:  Social History     Tobacco Use    Smoking status: Former     Current packs/day: 0.00     Average packs/day: 1 pack/day for 35.0 years (35.0 ttl pk-yrs)     Types: Cigarettes     Start date:      Quit date: 2012     Years since quittin.8    Smokeless tobacco: Never   Vaping Use    Vaping status: Never Used   Substance Use Topics    Alcohol use: Not Currently     Alcohol/week: 3.0 standard drinks of alcohol     Types: 1 Glasses of wine, 1 Cans of beer, 1 Standard drinks or equivalent per week     Comment: beer while watching sports, wine sometimes with dinner    Drug use: No       MEDICATIONS:    Current Outpatient Medications:     acetaminophen (TYLENOL) 325 mg tablet, Take 2 tablets (650 mg total) by mouth every 6 (six) hours as needed for mild pain, Disp: , Rfl:     amLODIPine (NORVASC) 10 mg tablet, Take 10 mg by mouth daily in the early morning  , Disp: , Rfl:     ascorbic acid (VITAMIN C) 500 MG tablet, Take 1 tablet (500 mg total) by mouth 2 (two) times a day, Disp: 60 tablet, Rfl: 0    Coenzyme Q10 (CoQ-10) 100 MG capsule, Take 100 mg by mouth daily, Disp: , Rfl:     docusate sodium (COLACE) 100 mg capsule, Take 1 capsule (100 mg total) by mouth 2 (two) times a day, Disp: 60 capsule, Rfl: 0    enoxaparin (LOVENOX) 40 mg/0.4 mL, Inject 0.4 mL (40 mg total) under the skin in the morning for 14 days, Disp: 5.6 mL, Rfl: 0    ferrous sulfate 324 (65 Fe) mg, Take 1 tablet (324 mg total) by mouth 2 (two) times a day before meals, Disp: 60 tablet,  Rfl: 0    folic acid (FOLVITE) 1 mg tablet, Take 1 tablet (1 mg total) by mouth daily, Disp: 30 tablet, Rfl: 0    gabapentin (NEURONTIN) 300 mg capsule, Take 2 capsules (600 mg total) by mouth 2 (two) times a day, Disp: 120 capsule, Rfl: 0    lisinopril (ZESTRIL) 5 mg tablet, Take 1 tablet (5 mg total) by mouth daily Do not start before January 5, 2023., Disp: 30 tablet, Rfl: 0    Multiple Vitamins-Minerals (multivitamin with minerals) tablet, Take 1 tablet by mouth daily, Disp: 30 tablet, Rfl: 0    multivitamin (THERAGRAN) TABS, Take 1 tablet by mouth daily, Disp: , Rfl:     omeprazole (PriLOSEC) 40 MG capsule, Take 40 mg by mouth daily with lunch, Disp: , Rfl:     saccharomyces boulardii (FLORASTOR) 250 mg capsule, Take 250 mg by mouth daily at bedtime, Disp: , Rfl:     Simponi 50 MG/0.5ML SOAJ, Instructed by Rheumatology to stop 1 month prior to surgery, Disp: , Rfl:     Azelastine-Fluticasone 137-50 MCG/ACT SUSP, 1 spray into each nostril every 12 (twelve) hours (Patient not taking: Reported on 11/14/2024), Disp: , Rfl:     methotrexate (PF) intrathecal injection, 8 mg by Intrathecal route weekly (Patient not taking: Reported on 11/14/2024), Disp: , Rfl:     Methotrexate, Anti-Rheumatic, (METHOTREXATE SC), Inject 0.8 mL under the skin every 7 days Instructed by Rheumatology to stop 7 days prior to surgery, Disp: , Rfl:     ALLERGIES:  Allergies   Allergen Reactions    Wasp Venom Anaphylaxis    American Cockroach Other (See Comments)     Allergy test done    Dog Epithelium (Canis Lupus Familiaris) Other (See Comments)     Allergy test was done  Also allergic to cat dander      Rinvoq [Upadacitinib] Dizziness       ROS:  Review of Systems     Constitutional: Negative for fatigue, fever or loss of appetite.   HENT: Negative.    Respiratory: Negative for shortness of breath, dyspnea.    Cardiovascular: Negative for chest pain/tightness.   Gastrointestinal: Negative for abdominal pain, N/V.   Endocrine: Negative for  cold/heat intolerance, unexplained weight loss/gain.   Genitourinary: Negative for flank pain, dysuria, hematuria.   Musculoskeletal: Negative for arthralgia   Skin: Negative for rash.    Neurological: Negative for numbness or tingling  Psychiatric/Behavioral: Negative for agitation.  _____________________________________________________  PHYSICAL EXAMINATION:    Blood pressure 138/78, pulse 75, temperature 97.6 °F (36.4 °C), temperature source Temporal, height 6' (1.829 m), SpO2 97%.    Constitutional: Oriented to person, place, and time. Appears well-developed and well-nourished. No distress.   HENT:   Head: Normocephalic.   Eyes: Conjunctivae are normal. Right eye exhibits no discharge. Left eye exhibits no discharge. No scleral icterus.   Cardiovascular: Normal rate.    Pulmonary/Chest: Effort normal.   Neurological: Alert and oriented to person, place, and time.   Skin: Skin is warm and dry. No rash noted. Not diaphoretic. No erythema. No pallor.   Psychiatric: Normal mood and affect. Behavior is normal. Judgment and thought content normal.      MUSCULOSKELETAL EXAMINATION:   Physical Exam  Ortho Exam    Right lower extremity is neurovascularly intact  Toes are pink and mobile  Compartments are soft  Improved range of motion of hip  Incision is clean, dry and intact  No warmth or erythema  There is some edema present along his right lower extremity  Brisk cap refill  Sensation intact  Objective:  BP Readings from Last 1 Encounters:   11/14/24 138/78      Wt Readings from Last 1 Encounters:   11/01/24 92.1 kg (203 lb 0.7 oz)        BMI:   Estimated body mass index is 27.54 kg/m² as calculated from the following:    Height as of this encounter: 6' (1.829 m).    Weight as of 11/1/24: 92.1 kg (203 lb 0.7 oz).      Scribe Attestation      I,:  Poli Higgins PA-C am acting as a scribe while in the presence of the attending physician.:       I,:  Alex Flores DO personally performed the services  described in this documentation    as scribed in my presence.:

## 2024-11-15 NOTE — TELEPHONE ENCOUNTER
Patient has the paperwork that he needs to be completed. He is going to have his neighbor drop it off at the office. Patient is aware of 10-14 business day turnaround.  Advised that he fills out all of his information and his name and  is on forms

## 2024-11-15 NOTE — TELEPHONE ENCOUNTER
Caller: Patient    Doctor: Sandra    Reason for call: Calling to see if his FMLA forms where received. Employer was faxing them. Notified patient we did not have them yet. Provided office fax number to have them faxed into. No further questions at this time    Call back#: [phone]

## 2024-11-21 ENCOUNTER — TELEPHONE (OUTPATIENT)
Dept: OBGYN CLINIC | Facility: HOSPITAL | Age: 64
End: 2024-11-21

## 2024-11-21 NOTE — TELEPHONE ENCOUNTER
Caller: Tano Rosales [facility]    Doctor: Sandra    Reason for call: Sharmin is asking that the form with the order number of [id number] and [id number] on the tops that were scanned in today with physicians signature. Asking if both can be printed and faxed back to them.    Fax# [phone]    Call back#: [phone]

## 2024-12-12 ENCOUNTER — OFFICE VISIT (OUTPATIENT)
Dept: OBGYN CLINIC | Facility: CLINIC | Age: 64
End: 2024-12-12

## 2024-12-12 ENCOUNTER — APPOINTMENT (OUTPATIENT)
Dept: LAB | Facility: HOSPITAL | Age: 64
End: 2024-12-12
Payer: MEDICARE

## 2024-12-12 VITALS
HEIGHT: 72 IN | OXYGEN SATURATION: 96 % | SYSTOLIC BLOOD PRESSURE: 120 MMHG | TEMPERATURE: 98.2 F | DIASTOLIC BLOOD PRESSURE: 85 MMHG | BODY MASS INDEX: 27.09 KG/M2 | HEART RATE: 69 BPM | WEIGHT: 200 LBS

## 2024-12-12 DIAGNOSIS — Z79.899 DRUG THERAPY: ICD-10-CM

## 2024-12-12 DIAGNOSIS — Z51.81 VISIT FOR MONITORING RITUXAN THERAPY: ICD-10-CM

## 2024-12-12 DIAGNOSIS — Z79.620 VISIT FOR MONITORING RITUXAN THERAPY: ICD-10-CM

## 2024-12-12 DIAGNOSIS — M05.79 SEROPOSITIVE RHEUMATOID ARTHRITIS OF MULTIPLE SITES (HCC): ICD-10-CM

## 2024-12-12 DIAGNOSIS — Z96.641 STATUS POST TOTAL REPLACEMENT OF RIGHT HIP: Primary | ICD-10-CM

## 2024-12-12 LAB
ALBUMIN SERPL BCG-MCNC: 3.9 G/DL (ref 3.5–5)
ALP SERPL-CCNC: 71 U/L (ref 34–104)
ALT SERPL W P-5'-P-CCNC: 16 U/L (ref 7–52)
ANION GAP SERPL CALCULATED.3IONS-SCNC: 10 MMOL/L (ref 4–13)
AST SERPL W P-5'-P-CCNC: 19 U/L (ref 13–39)
BASOPHILS # BLD AUTO: 0.15 THOUSANDS/ÂΜL (ref 0–0.1)
BASOPHILS NFR BLD AUTO: 1 % (ref 0–1)
BILIRUB DIRECT SERPL-MCNC: 0.07 MG/DL (ref 0–0.2)
BILIRUB SERPL-MCNC: 0.57 MG/DL (ref 0.2–1)
BUN SERPL-MCNC: 19 MG/DL (ref 5–25)
CALCIUM SERPL-MCNC: 9 MG/DL (ref 8.4–10.2)
CHLORIDE SERPL-SCNC: 102 MMOL/L (ref 96–108)
CO2 SERPL-SCNC: 23 MMOL/L (ref 21–32)
CREAT SERPL-MCNC: 0.78 MG/DL (ref 0.6–1.3)
CRP SERPL QL: 14.4 MG/L
EOSINOPHIL # BLD AUTO: 0.47 THOUSAND/ÂΜL (ref 0–0.61)
EOSINOPHIL NFR BLD AUTO: 5 % (ref 0–6)
ERYTHROCYTE [DISTWIDTH] IN BLOOD BY AUTOMATED COUNT: 14.6 % (ref 11.6–15.1)
ERYTHROCYTE [SEDIMENTATION RATE] IN BLOOD: 66 MM/HOUR (ref 0–19)
GFR SERPL CREATININE-BSD FRML MDRD: 95 ML/MIN/1.73SQ M
GLUCOSE P FAST SERPL-MCNC: 99 MG/DL (ref 65–99)
HCT VFR BLD AUTO: 39.3 % (ref 36.5–49.3)
HGB BLD-MCNC: 12.5 G/DL (ref 12–17)
IMM GRANULOCYTES # BLD AUTO: 0.03 THOUSAND/UL (ref 0–0.2)
IMM GRANULOCYTES NFR BLD AUTO: 0 % (ref 0–2)
LYMPHOCYTES # BLD AUTO: 1.99 THOUSANDS/ÂΜL (ref 0.6–4.47)
LYMPHOCYTES NFR BLD AUTO: 19 % (ref 14–44)
MCH RBC QN AUTO: 30.4 PG (ref 26.8–34.3)
MCHC RBC AUTO-ENTMCNC: 31.8 G/DL (ref 31.4–37.4)
MCV RBC AUTO: 96 FL (ref 82–98)
MONOCYTES # BLD AUTO: 1.2 THOUSAND/ÂΜL (ref 0.17–1.22)
MONOCYTES NFR BLD AUTO: 12 % (ref 4–12)
NEUTROPHILS # BLD AUTO: 6.54 THOUSANDS/ÂΜL (ref 1.85–7.62)
NEUTS SEG NFR BLD AUTO: 63 % (ref 43–75)
NRBC BLD AUTO-RTO: 0 /100 WBCS
PLATELET # BLD AUTO: 353 THOUSANDS/UL (ref 149–390)
PMV BLD AUTO: 9.2 FL (ref 8.9–12.7)
POTASSIUM SERPL-SCNC: 3.8 MMOL/L (ref 3.5–5.3)
PROT SERPL-MCNC: 6.8 G/DL (ref 6.4–8.4)
RBC # BLD AUTO: 4.11 MILLION/UL (ref 3.88–5.62)
SODIUM SERPL-SCNC: 135 MMOL/L (ref 135–147)
WBC # BLD AUTO: 10.38 THOUSAND/UL (ref 4.31–10.16)

## 2024-12-12 PROCEDURE — 99024 POSTOP FOLLOW-UP VISIT: CPT | Performed by: ORTHOPAEDIC SURGERY

## 2024-12-12 PROCEDURE — 85025 COMPLETE CBC W/AUTO DIFF WBC: CPT

## 2024-12-12 PROCEDURE — 80076 HEPATIC FUNCTION PANEL: CPT

## 2024-12-12 PROCEDURE — 36415 COLL VENOUS BLD VENIPUNCTURE: CPT

## 2024-12-12 PROCEDURE — 80048 BASIC METABOLIC PNL TOTAL CA: CPT

## 2024-12-12 PROCEDURE — 86140 C-REACTIVE PROTEIN: CPT

## 2024-12-12 PROCEDURE — 85652 RBC SED RATE AUTOMATED: CPT

## 2024-12-16 ENCOUNTER — TELEPHONE (OUTPATIENT)
Dept: OBGYN CLINIC | Facility: MEDICAL CENTER | Age: 64
End: 2024-12-16

## 2024-12-16 NOTE — TELEPHONE ENCOUNTER
Caller:      Doctor: Dr Flores     Reason for call: The patient brought in paperwork on 12/12 [facility]. He is asking when it is done may he please have a paper copy mailed to him.  Thank you.    Call back#: [phone]

## 2024-12-24 ENCOUNTER — HOSPITAL ENCOUNTER (EMERGENCY)
Facility: HOSPITAL | Age: 64
Discharge: HOME | End: 2024-12-24
Attending: EMERGENCY MEDICINE
Payer: MEDICARE

## 2024-12-24 ENCOUNTER — APPOINTMENT (EMERGENCY)
Dept: CT IMAGING | Facility: HOSPITAL | Age: 64
End: 2024-12-24
Payer: MEDICARE

## 2024-12-24 ENCOUNTER — APPOINTMENT (EMERGENCY)
Dept: RADIOLOGY | Facility: HOSPITAL | Age: 64
End: 2024-12-24
Payer: MEDICARE

## 2024-12-24 VITALS
OXYGEN SATURATION: 97 % | SYSTOLIC BLOOD PRESSURE: 141 MMHG | WEIGHT: 207.45 LBS | TEMPERATURE: 98.8 F | DIASTOLIC BLOOD PRESSURE: 79 MMHG | RESPIRATION RATE: 16 BRPM | HEART RATE: 59 BPM | BODY MASS INDEX: 28.14 KG/M2

## 2024-12-24 DIAGNOSIS — M25.561 RIGHT KNEE PAIN: ICD-10-CM

## 2024-12-24 DIAGNOSIS — M62.838 MUSCLE SPASM: ICD-10-CM

## 2024-12-24 DIAGNOSIS — S39.012A ACUTE MYOFASCIAL STRAIN OF LUMBAR REGION, INITIAL ENCOUNTER: ICD-10-CM
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
## 2024-12-24 PROCEDURE — 74177 CT ABD & PELVIS W/CONTRAST: CPT

## 2024-12-24 PROCEDURE — 99284 EMERGENCY DEPT VISIT MOD MDM: CPT

## 2024-12-24 PROCEDURE — 96372 THER/PROPH/DIAG INJ SC/IM: CPT

## 2024-12-24 PROCEDURE — 73564 X-RAY EXAM KNEE 4 OR MORE: CPT

## 2024-12-24 PROCEDURE — 99285 EMERGENCY DEPT VISIT HI MDM: CPT | Performed by: EMERGENCY MEDICINE

## 2024-12-24 RX ORDER — ASPIRIN 325 MG
325 TABLET ORAL DAILY
COMMUNITY

## 2024-12-24 RX ORDER — ACETAMINOPHEN 325 MG/1
650 TABLET ORAL ONCE
Status: COMPLETED | OUTPATIENT
Start: 2024-12-24 | End: 2024-12-24

## 2024-12-24 RX ORDER — OXYCODONE HYDROCHLORIDE 5 MG/1
5 TABLET ORAL ONCE
Refills: 0 | Status: COMPLETED | OUTPATIENT
Start: 2024-12-24 | End: 2024-12-24

## 2024-12-24 RX ORDER — METHOCARBAMOL 500 MG/1
500 TABLET, FILM COATED ORAL 2 TIMES DAILY PRN
Qty: 20 TABLET | Refills: 0 | Status: SHIPPED | OUTPATIENT
Start: 2024-12-24

## 2024-12-24 RX ORDER — KETOROLAC TROMETHAMINE 30 MG/ML
15 INJECTION, SOLUTION INTRAMUSCULAR; INTRAVENOUS ONCE
Status: COMPLETED | OUTPATIENT
Start: 2024-12-24 | End: 2024-12-24

## 2024-12-24 RX ORDER — LIDOCAINE 50 MG/G
1 PATCH TOPICAL ONCE
Status: DISCONTINUED | OUTPATIENT
Start: 2024-12-24 | End: 2024-12-24 | Stop reason: HOSPADM

## 2024-12-24 RX ORDER — METHOCARBAMOL 500 MG/1
500 TABLET, FILM COATED ORAL ONCE
Status: COMPLETED | OUTPATIENT
Start: 2024-12-24 | End: 2024-12-24

## 2024-12-24 RX ORDER — OXYCODONE AND ACETAMINOPHEN 5; 325 MG/1; MG/1
1 TABLET ORAL EVERY 6 HOURS PRN
Qty: 12 TABLET | Refills: 0 | Status: SHIPPED | OUTPATIENT
Start: 2024-12-24 | End: 2024-12-28

## 2024-12-24 RX ORDER — NAPROXEN SODIUM 220 MG/1
220 TABLET, FILM COATED ORAL 2 TIMES DAILY WITH MEALS
Qty: 60 TABLET | Refills: 0 | Status: SHIPPED | OUTPATIENT
Start: 2024-12-24

## 2024-12-24 RX ORDER — NAPROXEN 250 MG/1
250 TABLET ORAL ONCE
Status: COMPLETED | OUTPATIENT
Start: 2024-12-24 | End: 2024-12-24

## 2024-12-24 RX ADMIN — LIDOCAINE 5% 1 PATCH: 700 PATCH TOPICAL at 12:07

## 2024-12-24 RX ADMIN — OXYCODONE HYDROCHLORIDE 5 MG: 5 TABLET ORAL at 14:50

## 2024-12-24 RX ADMIN — NAPROXEN 250 MG: 250 TABLET ORAL at 14:50

## 2024-12-24 RX ADMIN — IOHEXOL 100 ML: 350 INJECTION, SOLUTION INTRAVENOUS at 12:45

## 2024-12-24 RX ADMIN — METHOCARBAMOL TABLETS 500 MG: 500 TABLET, COATED ORAL at 12:07

## 2024-12-24 RX ADMIN — KETOROLAC TROMETHAMINE 15 MG: 30 INJECTION, SOLUTION INTRAMUSCULAR at 12:07

## 2024-12-24 RX ADMIN — ACETAMINOPHEN 650 MG: 325 TABLET ORAL at 12:07

## 2024-12-24 NOTE — ED PROVIDER NOTES
Time reflects when diagnosis was documented in both MDM as applicable and the Disposition within this note       Time User Action Codes Description Comment    12/24/2024  2:43 PM LeslyGustavo lockettga Add [W19.XXXA] Fall, initial encounter     12/24/2024  2:43 PM AlejandraGustavo lockettga Add [S39.012A] Acute myofascial strain of lumbar region, initial encounter     12/24/2024  2:44 PM Cherri Aceves Add [M25.561] Right knee pain     12/24/2024  2:44 PM Cherri Aceves Add [M62.838] Muscle spasm           ED Disposition       ED Disposition   Discharge    Condition   Stable    Date/Time   Tue Dec 24, 2024  2:43 PM    Comment   J Donal Baldwin discharge to home/self care.                   Assessment & Plan       Medical Decision Making  63-year-old male presenting with right-sided pain after sustaining a mechanical fall.  Vital signs reviewed, afebrile, and within normal limits.Tylenol, Toradol, and Robaxin administered for pain.  Differential diagnosis includes contusions, bruising, rib fractures, liver or kidney injury, hip injury including hardware malfunction, knee fracture, and other causes of patient's pain.  X-rays of right knee obtained, to my review, degenerative changes are present, no acute fracture or dislocation.  CT abdomen and pelvis obtained, revealing no acute traumatic injuries, revealing a moderate compression deformity of the superior endplate of L5 vertebral body where patient has no focal tenderness on exam and which is likely subacute or chronic.  Patient treated symptomatically.  Recommend continuing with analgesics, prescription for naproxen sent, prescription for Robaxin as a muscle relaxant sent.  I anticipate that patient is going to have exacerbation of pain, particularly this evening.  Following shared decision making including risks and benefits, prescription for oxycodone-acetaminophen sent to patient's pharmacy of choice. I reviewed the [place] Prescription Drug Monitoring Program Database. Based on the  information in the database combined with my clinical evaluation, I have determined that a prescription for a short course of a controlled substance may be prescribed. Patient provided with instructions for safe use of the medication and expresses understanding of the side-effects, including possibility of dependence to the medication with resultant sequelae.    Patient discharged to home with recommendations for symptom control, return precautions, and plan for follow up.        Problems Addressed:  Acute myofascial strain of lumbar region, initial encounter: acute illness or injury  Fall, initial encounter: acute illness or injury  Muscle spasm: acute illness or injury  Right knee pain: acute illness or injury    Amount and/or Complexity of Data Reviewed  Radiology: ordered. Decision-making details documented in ED Course.    Risk  OTC drugs.  Prescription drug management.             Medications   acetaminophen (TYLENOL) tablet 650 mg (650 mg Oral Given 12/24/24 1207)   ketorolac (TORADOL) injection 15 mg (15 mg Intramuscular Given 12/24/24 1207)   methocarbamol (ROBAXIN) tablet 500 mg (500 mg Oral Given 12/24/24 1207)   iohexol (OMNIPAQUE) 350 MG/ML injection (MULTI-DOSE) 100 mL (100 mL Intravenous Given 12/24/24 1245)   oxyCODONE (ROXICODONE) IR tablet 5 mg (5 mg Oral Given 12/24/24 1450)   naproxen (NAPROSYN) tablet 250 mg (250 mg Oral Given 12/24/24 1450)       ED Risk Strat Scores                          SBIRT 22yo+      Flowsheet Row Most Recent Value   Initial Alcohol Screen: US AUDIT-C     1. How often do you have a drink containing alcohol? 0 Filed at: 12/24/2024 1107   2. How many drinks containing alcohol do you have on a typical day you are drinking?  0 Filed at: 12/24/2024 1107   3a. Male UNDER 65: How often do you have five or more drinks on one occasion? 0 Filed at: 12/24/2024 1107   3b. FEMALE Any Age, or MALE 65+: How often do you have 4 or more drinks on one occassion? 0 Filed at: 12/24/2024  1107   Audit-C Score 0 Filed at: 12/24/2024 1107   RALPH: How many times in the past year have you...    Used an illegal drug or used a prescription medication for non-medical reasons? Never Filed at: 12/24/2024 1107                            History of Present Illness       Chief Complaint   Patient presents with    Fall     Patient slipped and fell on ice. Recent right hip replacement 10/8/24. No head strike, no loc, on asa. Patient has right lower, right hip pain radiating down his right leg.        Past Medical History:   Diagnosis Date    Arthritis 2003    Bladder cancer (HCC)     BPH (benign prostatic hyperplasia)     Cancer (HCC) 2011    Bladder cancer    COPD (chronic obstructive pulmonary disease) (HCC) 06/2020    Obstructive sleep apnoea    CPAP (continuous positive airway pressure) dependence     GERD (gastroesophageal reflux disease)     HL (hearing loss)     Hypertension     Nasal congestion     Neuropathy     Pneumonia 12/22/2022    Rheumatoid arthritis (HCC)     Sleep apnea     Sleep difficulties     Tinnitus     Umbilical hernia       Past Surgical History:   Procedure Laterality Date    COLONOSCOPY      CYSTOSCOPY      with bladder biopsy and resection of large bladder tumor- Dr. Castro     CYSTOSCOPY  11/09/2012, 08/03/2012, 06/04/2013, 12/21/2015, 6/23/2016, 1/20/2017, 2/9/2018    Diagnostic- Dr. Castro     ESOPHAGOGASTRODUODENOSCOPY      KNEE SURGERY Right     SD ARTHRP ACETBLR/PROX FEM PROSTC AGRFT/ALGRFT Right 10/30/2024    Procedure: ARTHROPLASTY HIP TOTAL;  Surgeon: Alex Flores DO;  Location: [facility];  Service: Orthopedics    SD ARTHRS KNE SURG W/MENISCECTOMY MED/LAT W/SHVG Left 10/19/2016    Procedure: KNEE ARTHROSCOPIC PARTIAL MEDIAL MENISCECTOMY ;  Surgeon: Juni Shelley MD;  Location:  Main OR;  Service: Orthopedics    SD CORRECTION HAMMERTOE Left 04/01/2022    Procedure: REPAIR HAMMERTOE RIGHT 3RD TOE;  Surgeon: Abraham Perkins DPM;  Location:  MAIN OR;  Service: Podiatry    SD EXC  B9 LESION MRGN XCP SK TG T/A/L 2.1-3.0 CM Bilateral 2018    Procedure: LEFT PROXIMAL FOREARM EXCISIONAL BIOPSY OF SUBCUTANEOUS SOFT TISSUE MASS; BILATERAL OLECRANON BURSECTOMY;  Surgeon: Juni Shelley MD;  [facility];  Service: Orthopedics    IA EXC NEUROMA CUTAN NRV SURGLY IDENTIFIABLE Left 2022    Procedure: EXCISION NEUROMA RIGHT 2ND INTERSPACE;  Surgeon: Abraham Perkins DPM;  Location:  MAIN OR;  Service: Podiatry    IA TRURL ELECTROSURG RESCJ PROSTATE BLEED COMPLETE N/A 10/05/2018    Procedure: TRANSURETHRAL RESECTION OF PROSTATE (TURP);  Surgeon: Thierno Castro MD;  Location: [facility];  Service: Urology    PROSTATE SURGERY      UMBILICAL HERNIA REPAIR      WISDOM TOOTH EXTRACTION        Family History   Problem Relation Age of Onset    Heart disease Mother     Diabetes Father     Gout Brother     Heart disease Brother     Cancer Paternal Grandfather         Lung, bone marrow cancer    Cancer Maternal Uncle         Lung cancer ( mesothelioma), according to cousin      Social History     Tobacco Use    Smoking status: Former     Current packs/day: 0.00     Average packs/day: 1 pack/day for 35.0 years (35.0 ttl pk-yrs)     Types: Cigarettes     Start date:      Quit date: 2012     Years since quittin.9    Smokeless tobacco: Never   Vaping Use    Vaping status: Never Used   Substance Use Topics    Alcohol use: Not Currently     Alcohol/week: 3.0 standard drinks of alcohol     Types: 1 Glasses of wine, 1 Cans of beer, 1 Standard drinks or equivalent per week     Comment: beer while watching sports, wine sometimes with dinner    Drug use: No      E-Cigarette/Vaping    E-Cigarette Use Never User       E-Cigarette/Vaping Substances    Nicotine No     THC No     CBD No     Flavoring No     Other No     Unknown No       I have reviewed and agree with the history as documented.     63 y.o. male presenting with right flank and right hip pain radiating down to right knee after sustaining a  fall. Patient was outside and slipped and fell on ice. He did not strike his head, but he immediately felt pain in the right side of his torso and his right hip, which he had recently had replaced on 10/8/24. Patient ultimately got up, but he continues to have aforementioned pain which radiates down to his right knee.   Not anticoagulated.        Review of Systems   Genitourinary:  Positive for flank pain.   Musculoskeletal:  Positive for back pain and gait problem. Negative for neck pain.   All other systems reviewed and are negative.          Objective       ED Triage Vitals [12/24/24 1107]   Temperature Pulse Blood Pressure Respirations SpO2 Patient Position - Orthostatic VS   98.4 °F (36.9 °C) 75 128/89 16 97 % Sitting      Temp Source Heart Rate Source BP Location FiO2 (%) Pain Score    Temporal Monitor Right arm -- 8      Vitals      Date and Time Temp Pulse SpO2 Resp BP Pain Score FACES Pain Rating User   12/24/24 1450 -- -- -- -- -- 5 -- [facility]   12/24/24 1430 98.8 °F (37.1 °C) 59 97 % 16 141/79 5 -- [facility]   12/24/24 1330 98.6 °F (37 °C) 56 96 % 16 113/71 6 -- [facility]   12/24/24 1300 98.5 °F (36.9 °C) 69 97 % 16 147/94 6 -- [facility]   12/24/24 1237 -- -- -- -- -- 6 -- [facility]   12/24/24 1207 -- -- -- -- -- 8 -- [facility]   12/24/24 1200 98.5 °F (36.9 °C) 57 98 % 16 134/96 8 -- [facility]   12/24/24 1107 98.4 °F (36.9 °C) 75 97 % 16 128/89 8 -- [facility]            Physical Exam  ED Triage Vitals [12/24/24 1107]   Temperature Pulse Respirations Blood Pressure SpO2   98.4 °F (36.9 °C) 75 16 128/89 97 %      Temp Source Heart Rate Source Patient Position - Orthostatic VS BP Location FiO2 (%)   Temporal Monitor Sitting Right arm --      Pain Score       8           Vital signs and nursing notes reviewed    CONSTITUTIONAL: male appearing stated age resting in bed, in no acute distress  HEENT: atraumatic, normocephalic. Sclera anicteric, conjunctiva are not injected. Moist oral mucosa  CARDIOVASCULAR/CHEST: RRR, no M/R/G. 2+ radial pulses  PULMONARY:  Breathing comfortably on RA. Breath sounds are equal and clear to auscultation  ABDOMEN: non-distended. BS present, normoactive. Tender with palpation of right flank and right ASIS.  MSK: Tender with palpation of ASIS, right lateral hip and right knee. Moves all extremities, no deformities, no peripheral edema, no calf asymmetry  NEURO: Awake, alert, and oriented x 3. Face symmetric. Moves all extremities spontaneously. No focal neurologic deficits  SKIN: Warm, appears well-perfused  MENTAL STATUS: Normal affect      Results Reviewed       None            XR knee 4+ vw right injury   Final Interpretation by Jad Waters MD ( [id number])      No acute osseous abnormality.      Degenerative changes as described.         Computerized Assisted Algorithm (CAA) may have been used to analyze all applicable images.         Workstation performed: [id number]         CT abdomen pelvis with contrast   Final Interpretation by Sheri Abdalla MD ( [id number])      No acute intra-abdominal pathology.      Uncomplicated colonic diverticulosis.      Moderate compression deformity involving the superior endplate of the L5 vertebral body, new from , likely chronic, although acute on chronic component is not excluded; correlate with site of tenderness. No retropulsion.         Workstation performed: [id number]             Procedures    ED Medication and Procedure Management   Prior to Admission Medications   Prescriptions Last Dose Informant Patient Reported? Taking?   Azelastine-Fluticasone 137-50 MCG/ACT SUSP   Yes No   Si spray into each nostril every 12 (twelve) hours   Patient not taking: Reported on 10/10/2024   Coenzyme Q10 (CoQ-10) 100 MG capsule   Yes No   Sig: Take 100 mg by mouth daily   Methotrexate, Anti-Rheumatic, (METHOTREXATE SC)   Yes No   Sig: Inject 0.8 mL under the skin every 7 days Instructed by Rheumatology to stop 7 days prior to surgery   Multiple Vitamins-Minerals (multivitamin with minerals)  tablet 2024  No Yes   Sig: Take 1 tablet by mouth daily   Simponi 50 MG/0.5ML SOAJ Not Taking  Yes No   Sig: Instructed by Rheumatology to stop 1 month prior to surgery   Patient not taking: Reported on 2024   acetaminophen (TYLENOL) 325 mg tablet   No No   Sig: Take 2 tablets (650 mg total) by mouth every 6 (six) hours as needed for mild pain   amLODIPine (NORVASC) 10 mg tablet 2024 Self Yes Yes   Sig: Take 10 mg by mouth daily in the early morning     ascorbic acid (VITAMIN C) 500 MG tablet 2024  No Yes   Sig: Take 1 tablet (500 mg total) by mouth 2 (two) times a day   aspirin 325 mg tablet 2024  Yes Yes   Sig: Take 325 mg by mouth daily   docusate sodium (COLACE) 100 mg capsule Not Taking  No No   Sig: Take 1 capsule (100 mg total) by mouth 2 (two) times a day   Patient not taking: Reported on 2024   enoxaparin (LOVENOX) 40 mg/0.4 mL Not Taking  No No   Sig: Inject 0.4 mL (40 mg total) under the skin in the morning for 14 days   Patient not taking: Reported on 2024   ferrous sulfate 324 (65 Fe) mg Not Taking  No No   Sig: Take 1 tablet (324 mg total) by mouth 2 (two) times a day before meals   Patient not taking: Reported on 2024   folic acid (FOLVITE) 1 mg tablet 2024  No Yes   Sig: Take 1 tablet (1 mg total) by mouth daily   gabapentin (NEURONTIN) 300 mg capsule 2024  No Yes   Sig: Take 2 capsules (600 mg total) by mouth 2 (two) times a day   lisinopril (ZESTRIL) 5 mg tablet 2024  No Yes   Sig: Take 1 tablet (5 mg total) by mouth daily Do not start before 2023.   methotrexate (PF) intrathecal injection Past Week  Yes Yes   Si mg by Intrathecal route weekly   multivitamin (THERAGRAN) TABS  Self Yes No   Sig: Take 1 tablet by mouth daily   omeprazole (PriLOSEC) 40 MG capsule 2024 Self Yes Yes   Sig: Take 40 mg by mouth daily with lunch   saccharomyces boulardii (FLORASTOR) 250 mg capsule  Self Yes No   Sig: Take 250 mg by mouth  daily at bedtime      Facility-Administered Medications: None     Discharge Medication List as of 12/24/2024  2:51 PM        START taking these medications    Details   methocarbamol (ROBAXIN) 500 mg tablet Take 1 tablet (500 mg total) by mouth 2 (two) times a day as needed for muscle spasms, Starting Tue 12/24/2024, Normal      naproxen sodium (ALEVE) 220 MG tablet Take 1 tablet (220 mg total) by mouth 2 (two) times a day with meals, Starting Tue 12/24/2024, Normal      oxyCODONE-acetaminophen (Percocet) 5-325 mg per tablet Take 1 tablet by mouth every 6 (six) hours as needed for severe pain for up to 4 days Max Daily Amount: 4 tablets, Starting Tue 12/24/2024, Until Sat 12/28/2024 at 2359, Normal           CONTINUE these medications which have NOT CHANGED    Details   amLODIPine (NORVASC) 10 mg tablet Take 10 mg by mouth daily in the early morning  , Historical Med      ascorbic acid (VITAMIN C) 500 MG tablet Take 1 tablet (500 mg total) by mouth 2 (two) times a day, Starting Tue 10/22/2024, Normal      aspirin 325 mg tablet Take 325 mg by mouth daily, Historical Med      folic acid (FOLVITE) 1 mg tablet Take 1 tablet (1 mg total) by mouth daily, Starting Tue 10/22/2024, Normal      gabapentin (NEURONTIN) 300 mg capsule Take 2 capsules (600 mg total) by mouth 2 (two) times a day, Starting Wed 1/4/2023, Normal      lisinopril (ZESTRIL) 5 mg tablet Take 1 tablet (5 mg total) by mouth daily Do not start before January 5, 2023., Starting u 1/5/2023, Normal      methotrexate (PF) intrathecal injection 8 mg by Intrathecal route weekly, Starting Wed 5/8/2024, Historical Med      Multiple Vitamins-Minerals (multivitamin with minerals) tablet Take 1 tablet by mouth daily, Starting Tue 10/22/2024, Normal      omeprazole (PriLOSEC) 40 MG capsule Take 40 mg by mouth daily with lunch, Historical Med      acetaminophen (TYLENOL) 325 mg tablet Take 2 tablets (650 mg total) by mouth every 6 (six) hours as needed for mild pain,  Starting Fri 11/1/2024, No Print      Azelastine-Fluticasone 137-50 MCG/ACT SUSP 1 spray into each nostril every 12 (twelve) hours, Starting Mon 1/24/2022, Historical Med      Coenzyme Q10 (CoQ-10) 100 MG capsule Take 100 mg by mouth daily, Historical Med      docusate sodium (COLACE) 100 mg capsule Take 1 capsule (100 mg total) by mouth 2 (two) times a day, Starting Fri 11/1/2024, Until Sun 12/1/2024, Normal      enoxaparin (LOVENOX) 40 mg/0.4 mL Inject 0.4 mL (40 mg total) under the skin in the morning for 14 days, Starting Thu 10/31/2024, Until Thu 11/14/2024, Normal      ferrous sulfate 324 (65 Fe) mg Take 1 tablet (324 mg total) by mouth 2 (two) times a day before meals, Starting Tue 10/22/2024, Normal      Methotrexate, Anti-Rheumatic, (METHOTREXATE SC) Inject 0.8 mL under the skin every 7 days Instructed by Rheumatology to stop 7 days prior to surgery, Historical Med      multivitamin (THERAGRAN) TABS Take 1 tablet by mouth daily, Historical Med      saccharomyces boulardii (FLORASTOR) 250 mg capsule Take 250 mg by mouth daily at bedtime, Historical Med      Simponi 50 MG/0.5ML SOAJ Instructed by Rheumatology to stop 1 month prior to surgery, Historical Med           No discharge procedures on file.  ED SEPSIS DOCUMENTATION   Time reflects when diagnosis was documented in both MDM as applicable and the Disposition within this note       Time User Action Codes Description Comment    12/24/2024  2:43 PM Cherri Aceves [W19.XXXA] Fall, initial encounter     12/24/2024  2:43 PM Cherri Aceves [S39.012A] Acute myofascial strain of lumbar region, initial encounter     12/24/2024  2:44 PM Cherri Aceves [M25.561] Right knee pain     12/24/2024  2:44 PM Cherri Aceves [M62.838] Muscle spasm                  Cherri Aceves MD  12/25/24 8519       Cherri Aceves MD  12/27/24 0040

## 2024-12-24 NOTE — DISCHARGE INSTRUCTIONS
You strained ('pulled') muscles of the right side of your back, right flank, and right hip. Please take naproxen as an anti-inflammatory and take Percocet (oxycodone-acetaminophen) for severe breakthrough pain. Use Robaxin as a muscle relaxant to help with any muscle spasms. Use lidocaine roll-on gel topically for your right flank.  There is an incidental finding of a moderate compression fracture of one of the lower lumbar vertebrae, L5, that is not likely to be contributing to your right-sided muscle pain.  Follow up with your primary care physician and with your orthopedic doctor.

## 2024-12-31 ENCOUNTER — TELEPHONE (OUTPATIENT)
Age: 64
End: 2024-12-31

## 2024-12-31 NOTE — TELEPHONE ENCOUNTER
Patient Will call back to see if he can stay with appointment on 1/7, if not patient requested for us to send a force on

## 2025-01-07 ENCOUNTER — OFFICE VISIT (OUTPATIENT)
Dept: OBGYN CLINIC | Facility: CLINIC | Age: 65
End: 2025-01-07
Payer: MEDICARE

## 2025-01-07 ENCOUNTER — TELEPHONE (OUTPATIENT)
Dept: OBGYN CLINIC | Facility: HOSPITAL | Age: 65
End: 2025-01-07

## 2025-01-07 ENCOUNTER — APPOINTMENT (OUTPATIENT)
Dept: RADIOLOGY | Facility: CLINIC | Age: 65
End: 2025-01-07
Payer: MEDICARE

## 2025-01-07 VITALS — WEIGHT: 207 LBS | BODY MASS INDEX: 28.04 KG/M2 | HEIGHT: 72 IN

## 2025-01-07 DIAGNOSIS — M17.11 PRIMARY OSTEOARTHRITIS OF RIGHT KNEE: ICD-10-CM

## 2025-01-07 DIAGNOSIS — Z96.641 STATUS POST TOTAL REPLACEMENT OF RIGHT HIP: Primary | ICD-10-CM

## 2025-01-07 DIAGNOSIS — Z96.641 STATUS POST TOTAL REPLACEMENT OF RIGHT HIP: ICD-10-CM

## 2025-01-07 DIAGNOSIS — M25.461 EFFUSION OF RIGHT KNEE: ICD-10-CM

## 2025-01-07 PROCEDURE — 99213 OFFICE O/P EST LOW 20 MIN: CPT | Performed by: ORTHOPAEDIC SURGERY

## 2025-01-07 PROCEDURE — 20610 DRAIN/INJ JOINT/BURSA W/O US: CPT | Performed by: ORTHOPAEDIC SURGERY

## 2025-01-07 PROCEDURE — 73502 X-RAY EXAM HIP UNI 2-3 VIEWS: CPT

## 2025-01-07 RX ORDER — BUPIVACAINE HYDROCHLORIDE 2.5 MG/ML
4 INJECTION, SOLUTION INFILTRATION; PERINEURAL
Status: COMPLETED | OUTPATIENT
Start: 2025-01-07 | End: 2025-01-07

## 2025-01-07 RX ORDER — TRIAMCINOLONE ACETONIDE 40 MG/ML
80 INJECTION, SUSPENSION INTRA-ARTICULAR; INTRAMUSCULAR
Status: COMPLETED | OUTPATIENT
Start: 2025-01-07 | End: 2025-01-07

## 2025-01-07 RX ADMIN — BUPIVACAINE HYDROCHLORIDE 4 ML: 2.5 INJECTION, SOLUTION INFILTRATION; PERINEURAL at 08:15

## 2025-01-07 RX ADMIN — TRIAMCINOLONE ACETONIDE 80 MG: 40 INJECTION, SUSPENSION INTRA-ARTICULAR; INTRAMUSCULAR at 08:15

## 2025-01-07 NOTE — TELEPHONE ENCOUNTER
Caller:  (Patient)    Doctor: Sandra    Reason for call: Patient is asking if he can return to work sooner than 3 months? He is asking if he can be extended two weeks to a month after 1/21/24. His FMLA forms would need to be updated and faxed in also he would need a new letter generated.   If this is the case as well would you like to seen him sooner?    Call back#: [phone]

## 2025-01-07 NOTE — LETTER
January 7, 2025     Patient: MIRTA Baldwin  YOB: 1960  Date of Visit: 1/7/2025      To Whom it May Concern:    MIRTA Baldwin is under my professional care. MIRTA Mansfield was seen in my office on 1/7/2025. MIRTA Mansfield has not yet achieved full medical benefit. He is to remain out of work until cleared by physician. He will be reevaluated in 3 months.    If you have any questions or concerns, please don't hesitate to call.         Sincerely,          Alex Flores,         CC: No Recipients

## 2025-01-07 NOTE — PROGRESS NOTES
Assessment/Plan:   Diagnoses and all orders for this visit:    Status post total replacement of right hip  Comments:  10/30/2024  Orders:  -     XR hip/pelv 2-3 vws right if performed; Future    Primary osteoarthritis of right knee  -     Large joint arthrocentesis: R knee    Effusion of right knee  -     Large joint arthrocentesis: R knee    Reviewed physical exam findings and x-ray findings with patient at time of visit. His right hip is progressing as expected postoperatively. He can continue with formal physical therapy as per protocol. At this time, due to the physicality of his job he is to refrain from work related activity until cleared by physician. Will be reevaluated in 3 months. He can now discontinue use of aspirin as DVT prophylaxis.    In regards to his right knee, his recent fall has likely exacerbated his osteoarthritis. 15 cc of synovial fluid was successfully aspirated from the right knee. Patient was offered, and accepted, Kenalog and Marcaine injection(s) to the right knee for relief of pain and inflammation.  Patient tolerated treatment(s) well. He will be seen in 3 months for re-evaluation of the right hip. Patient expresses understanding and is in agreement with this treatment plan.    The patient is doing better in regards to his right hip.  Strength and motion improving.  Continue therapy till he plateaus.  He does have synovitis along his right knee in which 15 cc of fluid was aspirated.  Was also injected with Kenalog and Marcaine.  There is still some inflammation present which is attributed to boggy synovium.  Return back in 3 months evaluation for his right hip with new x-rays.  He will follow-up with Dr. Mckeon regarding his knee    Subjective:   Patient ID: MIRTA Baldwin  1960     HPI  Patient is a 64 y.o. male who presents for follow-up evaluation 10 weeks s/p right JOSLYN performed 10/30/2024. Patient report mechanical fall on ice on 12/24/2024. [facility] where x-rays were taken of both the right knee and the right hip. There was no fracture noted, and he was released. On today's presentation he reports improvement in his right hip, but continues to walk with a cane, and continues to have unsteady gait. He denies any new onset bruising, swelling, numbness, or tingling of the right hip. He does report pain and swelling in the right knee.    The following portions of the patient's history were reviewed and updated as appropriate:  Past medical history, past surgical history, Family history, social history, current medications and allergies    Past Medical History:   Diagnosis Date    Arthritis 2003    Bladder cancer (HCC)     BPH (benign prostatic hyperplasia)     Cancer ([facility]) 2011    Bladder cancer    COPD (chronic obstructive pulmonary disease) ([facility]) 06/2020    Obstructive sleep apnoea    CPAP (continuous positive airway pressure) dependence     GERD (gastroesophageal reflux disease)     HL (hearing loss)     Hypertension     Nasal congestion     Neuropathy     Pneumonia 12/22/2022    Rheumatoid arthritis (HCC)     Sleep apnea     Sleep difficulties     Tinnitus     Umbilical hernia        Past Surgical History:   Procedure Laterality Date    COLONOSCOPY      CYSTOSCOPY      with bladder biopsy and resection of large bladder tumor- Dr. Castro     CYSTOSCOPY  11/09/2012, 08/03/2012, 06/04/2013, 12/21/2015, 6/23/2016, 1/20/2017, 2/9/2018    Diagnostic- Dr. Castro     ESOPHAGOGASTRODUODENOSCOPY      KNEE SURGERY Right     SD ARTHRP ACETBLR/PROX FEM PROSTC AGRFT/ALGRFT Right 10/30/2024    Procedure: ARTHROPLASTY HIP TOTAL;  Surgeon: Alex Flores DO;  Location: [facility];  Service: Orthopedics    SD ARTHRS KNE SURG W/MENISCECTOMY MED/LAT W/SHVG Left 10/19/2016    Procedure: KNEE ARTHROSCOPIC PARTIAL MEDIAL MENISCECTOMY ;  Surgeon: Juni Shelley MD;  Location: [facility];  Service: Orthopedics    SD CORRECTION HAMMERTOE Left 04/01/2022    Procedure:  REPAIR HAMMERTOE RIGHT 3RD TOE;  Surgeon: Abraham Perkins DPM;  Location:  MAIN OR;  Service: Podiatry    IN EXC B9 LESION MRGN XCP SK TG T/A/L 2.1-3.0 CM Bilateral 2018    Procedure: LEFT PROXIMAL FOREARM EXCISIONAL BIOPSY OF SUBCUTANEOUS SOFT TISSUE MASS; BILATERAL OLECRANON BURSECTOMY;  Surgeon: Juni Shelley MD;  Location: [facility];  Service: Orthopedics    IN EXC NEUROMA CUTAN NRV SURGLY IDENTIFIABLE Left 2022    Procedure: EXCISION NEUROMA RIGHT 2ND INTERSPACE;  Surgeon: Abraham Perkins DPM;  Location:  MAIN OR;  Service: Podiatry    IN TRURL ELECTROSURG RESCJ PROSTATE BLEED COMPLETE N/A 10/05/2018    Procedure: TRANSURETHRAL RESECTION OF PROSTATE (TURP);  Surgeon: Thierno Castro MD;  Location: [facility];  Service: Urology    PROSTATE SURGERY      UMBILICAL HERNIA REPAIR      WISDOM TOOTH EXTRACTION         Family History   Problem Relation Age of Onset    Heart disease Mother     Diabetes Father     Gout Brother     Heart disease Brother     Cancer Paternal Grandfather         Lung, bone marrow cancer    Cancer Maternal Uncle         Lung cancer ( mesothelioma), according to cousin       Social History     Socioeconomic History    Marital status:      Spouse name: None    Number of children: None    Years of education: None    Highest education level: None   Occupational History    None   Tobacco Use    Smoking status: Former     Current packs/day: 0.00     Average packs/day: 1 pack/day for 35.0 years (35.0 ttl pk-yrs)     Types: Cigarettes     Start date:      Quit date: 2012     Years since quittin.0    Smokeless tobacco: Never   Vaping Use    Vaping status: Never Used   Substance and Sexual Activity    Alcohol use: Not Currently     Alcohol/week: 3.0 standard drinks of alcohol     Types: 1 Glasses of wine, 1 Cans of beer, 1 Standard drinks or equivalent per week     Comment: beer while watching sports, wine sometimes with dinner    Drug use: No    Sexual activity: Not Currently      Partners: Female     Birth control/protection: Male Sterilization     Comment: Had a Vasectomy in my mid 30's   Other Topics Concern    None   Social History Narrative    None     Social Drivers of Health     Financial Resource Strain: Not on file   Food Insecurity: No Food Insecurity (10/30/2024)    Nursing - Inadequate Food Risk Classification     Worried About Running Out of Food in the Last Year: Not on file     Ran Out of Food in the Last Year: Not on file     Ran Out of Food in the Last Year: Never true   Transportation Needs: No Transportation Needs (10/30/2024)    Nursing - Transportation Risk Classification     Lack of Transportation: Not on file     Lack of Transportation: No   Physical Activity: Not on file   Stress: Not on file   Social Connections: Not on file   Intimate Partner Violence: Unknown (10/30/2024)    Nursing IPS     Feels Physically and Emotionally Safe: Not on file     Physically Hurt by Someone: Not on file     Humiliated or Emotionally Abused by Someone: Not on file     Physically Hurt by Someone: No     Hurt or Threatened by Someone: No   Housing Stability: Unknown (10/30/2024)    Nursing: Inadequate Housing Risk Classification     Has Housing: Not on file     Worried About Losing Housing: Not on file     Unable to Get Utilities: Not on file     Unable to Pay for Housing in the Last Year: No     Has Housin         Current Outpatient Medications:     amLODIPine (NORVASC) 10 mg tablet, Take 10 mg by mouth daily in the early morning  , Disp: , Rfl:     ascorbic acid (VITAMIN C) 500 MG tablet, Take 1 tablet (500 mg total) by mouth 2 (two) times a day, Disp: 60 tablet, Rfl: 0    aspirin 325 mg tablet, Take 325 mg by mouth daily, Disp: , Rfl:     Coenzyme Q10 (CoQ-10) 100 MG capsule, Take 100 mg by mouth daily, Disp: , Rfl:     folic acid (FOLVITE) 1 mg tablet, Take 1 tablet (1 mg total) by mouth daily, Disp: 30 tablet, Rfl: 0    gabapentin (NEURONTIN) 300 mg capsule, Take 2 capsules  (600 mg total) by mouth 2 (two) times a day, Disp: 120 capsule, Rfl: 0    lisinopril (ZESTRIL) 5 mg tablet, Take 1 tablet (5 mg total) by mouth daily Do not start before January 5, 2023., Disp: 30 tablet, Rfl: 0    methotrexate (PF) intrathecal injection, 8 mg by Intrathecal route weekly, Disp: , Rfl:     Multiple Vitamins-Minerals (multivitamin with minerals) tablet, Take 1 tablet by mouth daily, Disp: 30 tablet, Rfl: 0    omeprazole (PriLOSEC) 40 MG capsule, Take 40 mg by mouth daily with lunch, Disp: , Rfl:     saccharomyces boulardii (FLORASTOR) 250 mg capsule, Take 250 mg by mouth daily at bedtime, Disp: , Rfl:     acetaminophen (TYLENOL) 325 mg tablet, Take 2 tablets (650 mg total) by mouth every 6 (six) hours as needed for mild pain, Disp: , Rfl:     Azelastine-Fluticasone 137-50 MCG/ACT SUSP, 1 spray into each nostril every 12 (twelve) hours, Disp: , Rfl:     docusate sodium (COLACE) 100 mg capsule, Take 1 capsule (100 mg total) by mouth 2 (two) times a day, Disp: 60 capsule, Rfl: 0    enoxaparin (LOVENOX) 40 mg/0.4 mL, Inject 0.4 mL (40 mg total) under the skin in the morning for 14 days, Disp: 5.6 mL, Rfl: 0    ferrous sulfate 324 (65 Fe) mg, Take 1 tablet (324 mg total) by mouth 2 (two) times a day before meals, Disp: 60 tablet, Rfl: 0    methocarbamol (ROBAXIN) 500 mg tablet, Take 1 tablet (500 mg total) by mouth 2 (two) times a day as needed for muscle spasms, Disp: 20 tablet, Rfl: 0    Methotrexate, Anti-Rheumatic, (METHOTREXATE SC), Inject 0.8 mL under the skin every 7 days Instructed by Rheumatology to stop 7 days prior to surgery, Disp: , Rfl:     multivitamin (THERAGRAN) TABS, Take 1 tablet by mouth daily, Disp: , Rfl:     naproxen sodium (ALEVE) 220 MG tablet, Take 1 tablet (220 mg total) by mouth 2 (two) times a day with meals, Disp: 60 tablet, Rfl: 0    Simponi 50 MG/0.5ML SOAJ, Instructed by Rheumatology to stop 1 month prior to surgery (Patient not taking: Reported on 12/24/2024), Disp: , Rfl:      Allergies   Allergen Reactions    Wasp Venom Anaphylaxis    American Cockroach Other (See Comments)     Allergy test done    Dog Epithelium (Canis Lupus Familiaris) Other (See Comments)     Allergy test was done  Also allergic to cat dander      Rinvoq [Upadacitinib] Dizziness       Review of Systems   Constitutional:  Negative for chills, fatigue and fever.   Gastrointestinal:  Negative for nausea.   Musculoskeletal:         As noted in HPI   Neurological:  Negative for dizziness, numbness and headaches.   All other systems reviewed and are negative.       Objective:  Ht 6' (1.829 m)   Wt 93.9 kg (207 lb)   BMI 28.07 kg/m²     Ortho Exam  Right hip -   Weight-bearing status: Full WBAT, uses single point cane as assistive device  Incision(s): Well-healed  Skin is warm and dry with no signs of erythema, ecchymosis, or infection  Negative soft tissue swelling or effusion  ROM: smooth passive motion  Strength: 4/5 seated hip flexion, 4/5 seated hip abduction/adduction  Calf compartments are soft  - Sosa's sign  2+ TP and DP pulses with brisk capillary refill to the toes  Sural, saphenous, tibial, superficial and deep peroneal motor and sensory distributions intact  Sensation light touch intact distally    Right Knee -   Patient ambulates with antalgic gait pattern  Uses single-point cane assistive device  No anatomical deformity  Skin is warm and dry to touch with no signs of erythema, ecchymosis, infection  Mild soft tissue swelling, mild effusion noted  ROM 5-95, improved to 0-115 following aspiration and injection  TTP over medial and lateral joint lines  Flexor and extensor mechanisms intact  Knee is stable to varus and valgus stress  - Lachman's  - Anterior Drawer, - Posterior Drawer  - medial Alvarez's, - lateral Alvarez's  Patella tracks centrally with palpable crepitus  Calf compartments are soft and supple  2+ TP and DP pulses with brisk capillary refill to the toes  Sural, saphenous, tibial,  "superficial and deep peroneal motor and sensory distributions intact  Sensation light touch intact distally    Physical Exam  Vitals reviewed.   Constitutional:       Appearance: He is well-developed.   HENT:      Head: Normocephalic and atraumatic.      Nose: Nose normal.   Eyes:      Conjunctiva/sclera: Conjunctivae normal.   Cardiovascular:      Rate and Rhythm: Normal rate.   Pulmonary:      Effort: Pulmonary effort is normal.   Musculoskeletal:      Cervical back: Neck supple.   Skin:     General: Skin is warm and dry.      Capillary Refill: Capillary refill takes less than 2 seconds.   Neurological:      Mental Status: He is alert and oriented to person, place, and time.   Psychiatric:         Mood and Affect: Mood normal.         Behavior: Behavior normal.          Diagnostic Test Review:  Attending Physician has personally reviewed pertinent imaging in PACS, impression is as follows:    Review of radiographic series taken 12/24/2024 of the AP pelvis and right hip shows well-seated total hip prosthesis with maintained alignment and no signs of loosening.    Review of radiographic series taken 12/24/2024 of the right knee shows no sign of acute osseous abnormality or malalignment. Tricompartmental osteoarthritis noted with medial joint space narrowing, anterior joint space narrowing, medial and anterior osteophyte formation, and chondrocalcinosis of the lateral compartment.    Large joint arthrocentesis: R knee  Universal Protocol:  Consent: Verbal consent obtained.  Risks and benefits: risks, benefits and alternatives were discussed  Consent given by: patient  Time out: Immediately prior to procedure a \"time out\" was called to verify the correct patient, procedure, equipment, support staff and site/side marked as required.  Timeout called at: 1/7/2025 8:37 AM.  Patient understanding: patient states understanding of the procedure being performed  Test results: test results available and properly labeled  Site " marked: the operative site was marked  Radiology Images displayed and confirmed. If images not available, report reviewed: imaging studies available  Patient identity confirmed: verbally with patient  Supporting Documentation  Indications: pain and joint swelling   Procedure Details  Location: knee - R knee  Preparation: Patient was prepped and draped in the usual sterile fashion  Needle gauge: 21G.  Ultrasound guidance: no  Approach: anterolateral  Medications administered: 80 mg triamcinolone acetonide 40 mg/mL; 4 mL bupivacaine 0.25 %    Aspirate amount: 15 mL  Aspirate: serous  Patient tolerance: patient tolerated the procedure well with no immediate complications  Dressing:  Sterile dressing applied           Scribe Attestation      I,:  Johnnie Stahl am acting as a scribe while in the presence of the attending physician.:       I,:  Alex Flores DO personally performed the services described in this documentation    as scribed in my presence.:

## 2025-01-08 NOTE — TELEPHONE ENCOUNTER
Caller: Patient    Doctor: Sandra Suarez    Reason for call: Patient states he needs to be released by the Dr for work. Patient states he needs to return to work on 1/27 or he will be terminated from his job, as they will not accept FMLA extensions and will not accept the updated forms from yesterday 1/7. Patient is bringing in forms to be filled out regarding returning to work with restrictions from his job ([employer]). Please advise.    Call back#: [phone]

## 2025-01-24 ENCOUNTER — TELEPHONE (OUTPATIENT)
Age: 65
End: 2025-01-24

## 2025-01-24 NOTE — TELEPHONE ENCOUNTER
Received call from pt stating he is looking into new insurance and was told by his  to apply for stroke and heart attack insurance just in case something happens in the further. Pt is calling today stating he was denied and is asking for the last office visit note he had with Dr. Alonzo to see if he has anything that might put him at risk for a stroke or heart attack to send to his insurance company. Informed pt he can view his last office visit note with Dr. Alonzo on 1/18/2023 through [employer]. Walked pt through this process where pt found the note. No further questions.

## 2025-01-27 ENCOUNTER — TELEPHONE (OUTPATIENT)
Dept: OBGYN CLINIC | Facility: HOSPITAL | Age: 65
End: 2025-01-27

## 2025-01-27 NOTE — TELEPHONE ENCOUNTER
Caller: Patient    Doctor: Sandra    Reason for call:  needed his work note sent to his employer [employer] he supposed to start back today but they need letter - faxed latest letter to fax# [phone]    Call back#: [phone]

## 2025-02-05 ENCOUNTER — OFFICE VISIT (OUTPATIENT)
Dept: PULMONOLOGY | Facility: CLINIC | Age: 65
End: 2025-02-05
Payer: MEDICARE

## 2025-02-05 VITALS
WEIGHT: 204 LBS | TEMPERATURE: 98.2 F | HEIGHT: 72 IN | SYSTOLIC BLOOD PRESSURE: 137 MMHG | DIASTOLIC BLOOD PRESSURE: 81 MMHG | OXYGEN SATURATION: 98 % | BODY MASS INDEX: 27.63 KG/M2 | HEART RATE: 61 BPM

## 2025-02-05 DIAGNOSIS — J43.9 PULMONARY EMPHYSEMA, UNSPECIFIED EMPHYSEMA TYPE (HCC): ICD-10-CM

## 2025-02-05 DIAGNOSIS — F17.211 CIGARETTE NICOTINE DEPENDENCE IN REMISSION: Primary | ICD-10-CM

## 2025-02-05 DIAGNOSIS — G47.33 OSA (OBSTRUCTIVE SLEEP APNEA): ICD-10-CM

## 2025-02-05 DIAGNOSIS — J84.9 ILD (INTERSTITIAL LUNG DISEASE) (HCC): ICD-10-CM

## 2025-02-05 PROCEDURE — 99214 OFFICE O/P EST MOD 30 MIN: CPT

## 2025-02-05 NOTE — PROGRESS NOTES
Progress note - Pulmonary Medicine   MIRTA Baldwin 64 y.o. male MRN: [id number]       Impression & Plan:   MIRTA Baldwin is a 64 y.o. male with PMHx of emphysema, RA, ILD, HTN, CAD, bladder cancer, VIVIEN on CPAP, MGUS, BPH and former tobacco use who presents for follow-up.    ILD (Interstitial Lung Disease)  - The patient has a history of mild subpleural reticulations with mild DLCO reduction as well as emphysema on imaging.  Suspect this is a CTD-ILD from his RA, and has been stable recently with no new respiratory symptoms.  Most recent PFTs are stable to slightly improved from prior and 6 MWT does not demonstrate requirement for oxygen with ambulation.  - Repeat CT as below.  - Continue follow-up with rheumatology.  - Follow-up in 7 months.    VIVIEN (Obstructive Sleep Apnea)  - The patient has a history of VIVIEN and was on APAP with 2L O2, initial sleep study is not currently available.  At present he is not using the device due to septal perforation.  - Recommend resuming APAP once cleared by ENT.  Will plan to repeat overnight oximetry on CPAP to assess for need to resume nocturnal O2 therapy.  - Encouraged healthy lifestyle with adequate sleep (7-9 hours per night), healthy balanced diet and routine exercise and avoiding driving while drowsy.    Pulmonary Emphysema  - Emphysema on imaging without obstructive deficit on PFTs, he is currently maintained off of inhalers.  - Breathing is stable and he would like to continue holding on any inhaler therapy at this time.  - Recommend  daily activity as tolerated.  - Recommend flu vaccine.    Cigarette Nicotine Dependence in Remission  - He continues to remain free from tobacco, recommend continued cessation.  - He will next be due for LDCT in 9/2025, order placed.  -     CT lung screening program; Future    Return in about 7 months (around 9/20/2025).  ______________________________________________________________________    HPI:    MIRTA Baldwin is a 64 y.o. male with  PMHx of emphysema, RA, ILD, HTN, CAD, bladder cancer, VIVIEN on CPAP, MGUS, BPH and former tobacco use who presents for follow-up.  Patient was last seen in the office on 9/18/2024 at which time plan was for PFTs with 6 MWT and resumption of CPAP with 2 L O2 nightly after recent dental procedure.  He reports that he has not been using his CPAP recently as he had a septal perforation with placement of a septal button after which he reports he was told not to use the CPAP by ENT.  He will be evaluated by ENT again next week and is hoping to start using it shortly thereafter.  He is not currently using any oxygen at night and states he no longer has oxygen at home.  Overall he states his breathing is stable from prior with no cough, wheezing or AGUILAR.  He has been slightly less active recently due to hip surgery, but states he was able to shovel snow recently without any problems.  He is maintained on no inhalers at this time and does not feel that he needs one.  He has remained tobacco free since last office visit.  He denies any recent course of steroids or antibiotics for respiratory purposes.    Current Medications:    Current Outpatient Medications:     amLODIPine (NORVASC) 10 mg tablet, Take 10 mg by mouth daily in the early morning  , Disp: , Rfl:     ascorbic acid (VITAMIN C) 500 MG tablet, Take 1 tablet (500 mg total) by mouth 2 (two) times a day, Disp: 60 tablet, Rfl: 0    Coenzyme Q10 (CoQ-10) 100 MG capsule, Take 100 mg by mouth daily, Disp: , Rfl:     folic acid (FOLVITE) 1 mg tablet, Take 1 tablet (1 mg total) by mouth daily, Disp: 30 tablet, Rfl: 0    gabapentin (NEURONTIN) 300 mg capsule, Take 2 capsules (600 mg total) by mouth 2 (two) times a day, Disp: 120 capsule, Rfl: 0    lisinopril (ZESTRIL) 5 mg tablet, Take 1 tablet (5 mg total) by mouth daily Do not start before January 5, 2023., Disp: 30 tablet, Rfl: 0    methotrexate (PF) intrathecal injection, 8 mg by Intrathecal route weekly, Disp: , Rfl:      Methotrexate, Anti-Rheumatic, (METHOTREXATE SC), Inject 0.8 mL under the skin every 7 days Instructed by Rheumatology to stop 7 days prior to surgery, Disp: , Rfl:     Multiple Vitamins-Minerals (multivitamin with minerals) tablet, Take 1 tablet by mouth daily, Disp: 30 tablet, Rfl: 0    omeprazole (PriLOSEC) 40 MG capsule, Take 40 mg by mouth daily with lunch, Disp: , Rfl:     saccharomyces boulardii (FLORASTOR) 250 mg capsule, Take 250 mg by mouth daily at bedtime, Disp: , Rfl:     Simponi 50 MG/0.5ML SOAJ, Instructed by Rheumatology to stop 1 month prior to surgery, Disp: , Rfl:     acetaminophen (TYLENOL) 325 mg tablet, Take 2 tablets (650 mg total) by mouth every 6 (six) hours as needed for mild pain, Disp: , Rfl:     aspirin 325 mg tablet, Take 325 mg by mouth daily (Patient not taking: Reported on 2/5/2025), Disp: , Rfl:     Azelastine-Fluticasone 137-50 MCG/ACT SUSP, 1 spray into each nostril every 12 (twelve) hours, Disp: , Rfl:     docusate sodium (COLACE) 100 mg capsule, Take 1 capsule (100 mg total) by mouth 2 (two) times a day, Disp: 60 capsule, Rfl: 0    enoxaparin (LOVENOX) 40 mg/0.4 mL, Inject 0.4 mL (40 mg total) under the skin in the morning for 14 days, Disp: 5.6 mL, Rfl: 0    ferrous sulfate 324 (65 Fe) mg, Take 1 tablet (324 mg total) by mouth 2 (two) times a day before meals, Disp: 60 tablet, Rfl: 0    methocarbamol (ROBAXIN) 500 mg tablet, Take 1 tablet (500 mg total) by mouth 2 (two) times a day as needed for muscle spasms, Disp: 20 tablet, Rfl: 0    multivitamin (THERAGRAN) TABS, Take 1 tablet by mouth daily, Disp: , Rfl:     naproxen sodium (ALEVE) 220 MG tablet, Take 1 tablet (220 mg total) by mouth 2 (two) times a day with meals, Disp: 60 tablet, Rfl: 0    Review of Systems:  Review of Systems  10-point system review completed, all of which are negative except as mentioned above.    Past medical history, surgical history, and family history were reviewed and updated as  appropriate    Social history updates:  Social History     Tobacco Use   Smoking Status Former    Current packs/day: 0.00    Average packs/day: 1 pack/day for 35.0 years (35.0 ttl pk-yrs)    Types: Cigarettes    Start date:     Quit date: 2012    Years since quittin.1   Smokeless Tobacco Never     PhysicalExamination:  Vitals:   /81 (BP Location: Left arm, Patient Position: Sitting, Cuff Size: Standard)   Pulse 61   Temp 98.2 °F (36.8 °C) (Temporal)   Ht 6' (1.829 m)   Wt 92.5 kg (204 lb)   SpO2 98%   BMI 27.67 kg/m²   Body mass index is 27.67 kg/m².    Constitutional: NAD, on room air, ambulating with a cane, no conversational dyspnea   Skin: Warm, dry, no rashes noted   Eyes: PERRL, normal conjunctiva  ENT: Nasal congestion absent, moist mucus membranes.  Neck: No JVD, trachea is midline, no adenopathy.  Resp: CTA B/L, no W/R/R   Cardiac: RRR, +S1/S2, no M/R/G  Extremities: No digital clubbing or pedal edema  Neuro: AAOx3    Diagnostic Data:  Labs:  I personally reviewed the most recent laboratory data pertinent to today's visit    Lab Results   Component Value Date    WBC 10.38 (H) 2024    HGB 12.5 2024    HCT 39.3 2024    MCV 96 2024     2024     Lab Results   Component Value Date    SODIUM 135 2024    K 3.8 2024    CO2 23 2024     2024    BUN 19 2024    CREATININE 0.78 2024    GLUCOSE 112 2015    CALCIUM 9.0 2024     PFT results:  The most recent pulmonary function tests were reviewed.  PFTs w/ BD and 6MWT -- 2024      % Predicted Z-Score   FVC 4.31 L 89% -0.69   FEV1 3.59 L 97% -0.13   FEV1/FVC 83   1      After administration of bronchodilator:          % Change   FVC 4.45 L +3   FEV1 3.64 L +1      Lung volumes by body plethysmography:      % Predicted Z-Score   Total Lung Capacity 6.99 L 94% -0.53   Residual Volume 2.76 L 113% 0.87   RV/TLC Ratio 40 116%        DLCO corrected for  hemoglobin level:      % Predicted Z-Score   DLCO 22.80 65%        6MWT:  Resting SpO2: 98% on room air  Resting HR: 63 bpm  Nusrat Scale Pre-Ambulation: 0  Minimal SpO2 with ambulation: 96% on room air  Maximal HR with ambulation: 88 bpm  Total Distance: 289.75 m  Stops: 0  Nusart Scale Post-Ambulation: 0    PFTs w/ BD -- 8/30/2023  FEV1/FVC Ratio: 81 %  Forced Vital Capacity: 4.23 L    87 % predicted  FEV1: 3.45 L93 % predicted     Post-bronchodilator results:  FEV1/FVC Ratio: 83%  Forced Vital Capacity: 4.15L, 85% predicted, -2% change  FEV1: 3.45L, 93% predicted, 0% change     Lung volumes by body plethysmography:   Total Lung Capacity 76 % predicted   Residual volume 60 % predicted     DLCO corrected for patients hemoglobin level: 63 %    On 6 minute walk testing, the patient started with resting room air saturation 97% and heart rate 58.  He walked 244m in 6 minutes with lowest O2 sat 95% on room air.  Dyspnea at end of test was 0/10 on the Nusrat scale.  No stops during testing.     Imaging:  I personally reviewed the images in PACS pertinent to today's visit:  LDCT -- 9/5/2024  Unchanged sub-6 mm pulmonary nodules.  Lung-RADS2, benign appearance or behavior.  Continue annual screening with LDCT in 12 months.  Mild coronary artery disease on qualitative assessment. Consider correlation with CAC score, quantification, or ASCVD risk assessment if not already performed.     HRCT -- 8/30/2023  Further improvement in groundglass and interstitial opacities compared to 2/27/2023, likely representing improving NSIP pattern of interstitial lung disease.  No new or progressing findings. No honeycombing.  Moderate emphysema. There are a few scattered stable sub-6 mm pulmonary nodules. Recommend annual lung cancer screening CT in 12 months if patient meets clinical criteria.     Other studies:  Echo Complete -- 9/21/2023    Left Ventricle: Left ventricular cavity size is normal. Wall thickness is normal. The left ventricular  "ejection fraction is 60%. Systolic function is normal. Wall motion is normal. Diastolic function is mildly abnormal, consistent with grade I (abnormal) relaxation.    Aortic Valve: There is mild regurgitation.    Tricuspid Valve: There is mild regurgitation.      Pauline Helton MD  Pulmonary-Critical Care and Sleep Medicine  02/05/25    Portions of the record may have been created with voice recognition software. Occasional wrong word or \"sound a like\" substitutions may have occurred due to the inherent limitations of voice recognition software. Please read the chart carefully and recognize, using context, where substitutions have occurred.  "

## 2025-02-27 ENCOUNTER — OFFICE VISIT (OUTPATIENT)
Dept: OBGYN CLINIC | Facility: CLINIC | Age: 65
End: 2025-02-27
Payer: MEDICARE

## 2025-02-27 VITALS — WEIGHT: 204 LBS | HEIGHT: 72 IN | BODY MASS INDEX: 27.63 KG/M2

## 2025-02-27 DIAGNOSIS — Z96.641 STATUS POST TOTAL REPLACEMENT OF RIGHT HIP: Primary | ICD-10-CM

## 2025-02-27 PROCEDURE — 99213 OFFICE O/P EST LOW 20 MIN: CPT | Performed by: ORTHOPAEDIC SURGERY

## 2025-02-27 NOTE — LETTER
February 27, 2025     Patient: MIRTA Baldwin  YOB: 1960  Date of Visit: 2/27/2025      To Whom it May Concern:    MIRTA Baldwin is under my professional care. MIRTA Mansfield was seen in my office on 2/27/2025. MIRTA Mansfield may return to work on 3/3/2025 without limitations .    If you have any questions or concerns, please don't hesitate to call.         Sincerely,          Alex Flores DO        CC: No Recipients

## 2025-02-27 NOTE — PROGRESS NOTES
ASSESSMENT/PLAN:    Diagnoses and all orders for this visit:    Status post total replacement of right hip  -     Cancel: XR hip/pelv 2-3 vws right if performed; Future        The patient was seen and examined.  He is doing very well since his surgery.  He may continue home exercise program.  He was given a note that he is cleared from work without limitations starting this 3/3/2025.  He will follow-up with our office in 2 months.  The patient is acceptable to this plan.    Return in about 2 months (around 4/27/2025).    The patient is doing quite well in regards to his right total hip replacement that was replaced in late October.  There is full range of motion.  There is no pain along the hip.  Incision clean and dry.  His main complaint is along his right knee in which she is being treated by another physician.  The patient should continue home exercise program for his hip.  Encourage ambulation.  He was released to go back to work full duty effective Monday regarding his hip.  Follow-up 2 months evaluation with new x-rays of right hip-2 views      _____________________________________________________  CHIEF COMPLAINT:  Chief Complaint   Patient presents with    Right Hip - Follow-up         SUBJECTIVE:  MIRTA Baldwin is a 64 y.o. male who presents to our office for a postop visit.  The patient is status post right total hip replacement from 10/30/2024.  Overall, he is pleased with the results of surgery.  He complains of occasional right groin discomfort.  He denies any numbness or tingling.  He denies any fever or chills.    The following portions of the patient's history were reviewed and updated as appropriate: allergies, current medications, past family history, past medical history, past social history, past surgical history and problem list.    PAST MEDICAL HISTORY:  Past Medical History:   Diagnosis Date    Arthritis 2003    Bladder cancer (HCC)     BPH (benign prostatic hyperplasia)     Cancer (HCC)  2011    Bladder cancer    COPD (chronic obstructive pulmonary disease) ([facility]) 06/2020    Obstructive sleep apnoea    CPAP (continuous positive airway pressure) dependence     GERD (gastroesophageal reflux disease)     HL (hearing loss)     Hypertension     Nasal congestion     Neuropathy     Pneumonia 12/22/2022    Rheumatoid arthritis ([facility])     Sleep apnea     Sleep difficulties     Tinnitus     Umbilical hernia        PAST SURGICAL HISTORY:  Past Surgical History:   Procedure Laterality Date    COLONOSCOPY      CYSTOSCOPY      with bladder biopsy and resection of large bladder tumor- Dr. Castro     CYSTOSCOPY  11/09/2012, 08/03/2012, 06/04/2013, 12/21/2015, 6/23/2016, 1/20/2017, 2/9/2018    Diagnostic- Dr. Castro     ESOPHAGOGASTRODUODENOSCOPY      KNEE SURGERY Right     OR ARTHRP ACETBLR/PROX FEM PROSTC AGRFT/ALGRFT Right 10/30/2024    Procedure: ARTHROPLASTY HIP TOTAL;  Surgeon: Alex Flores DO;  Location: [facility];  Service: Orthopedics    OR ARTHRS KNE SURG W/MENISCECTOMY MED/LAT W/SHVG Left 10/19/2016    Procedure: KNEE ARTHROSCOPIC PARTIAL MEDIAL MENISCECTOMY ;  Surgeon: Juni Shelley MD;  Location: [facility];  Service: Orthopedics    OR CORRECTION HAMMERTOE Left 04/01/2022    Procedure: REPAIR HAMMERTOE RIGHT 3RD TOE;  Surgeon: Abraham Perkins DPM;  Location:  MAIN OR;  Service: Podiatry    OR EXC B9 LESION MRGN XCP SK TG T/A/L 2.1-3.0 CM Bilateral 11/01/2018    Procedure: LEFT PROXIMAL FOREARM EXCISIONAL BIOPSY OF SUBCUTANEOUS SOFT TISSUE MASS; BILATERAL OLECRANON BURSECTOMY;  Surgeon: Juni Shelley MD;  [facility];  Service: Orthopedics    OR EXC NEUROMA CUTAN NRV SURGLY IDENTIFIABLE Left 04/01/2022    Procedure: EXCISION NEUROMA RIGHT 2ND INTERSPACE;  Surgeon: Abraham Perkins DPM;  Location:  MAIN OR;  Service: Podiatry    OR TRURL ELECTROSURG RESCJ PROSTATE BLEED COMPLETE N/A 10/05/2018    Procedure: TRANSURETHRAL RESECTION OF PROSTATE (TURP);  Surgeon: Thierno Castro MD;  Location: [facility];   Service: Urology    PROSTATE SURGERY      UMBILICAL HERNIA REPAIR      WISDOM TOOTH EXTRACTION         FAMILY HISTORY:  Family History   Problem Relation Age of Onset    Heart disease Mother     Diabetes Father     Gout Brother     Heart disease Brother     Cancer Paternal Grandfather         Lung, bone marrow cancer    Cancer Maternal Uncle         Lung cancer ( mesothelioma), according to cousin       SOCIAL HISTORY:  Social History     Tobacco Use    Smoking status: Former     Current packs/day: 0.00     Average packs/day: 1 pack/day for 35.0 years (35.0 ttl pk-yrs)     Types: Cigarettes     Start date:      Quit date: 2012     Years since quittin.1    Smokeless tobacco: Never   Vaping Use    Vaping status: Never Used   Substance Use Topics    Alcohol use: Not Currently     Alcohol/week: 3.0 standard drinks of alcohol     Types: 1 Glasses of wine, 1 Cans of beer, 1 Standard drinks or equivalent per week     Comment: beer while watching sports, wine sometimes with dinner    Drug use: No       MEDICATIONS:    Current Outpatient Medications:     amLODIPine (NORVASC) 10 mg tablet, Take 10 mg by mouth daily in the early morning  , Disp: , Rfl:     ascorbic acid (VITAMIN C) 500 MG tablet, Take 1 tablet (500 mg total) by mouth 2 (two) times a day, Disp: 60 tablet, Rfl: 0    Azelastine-Fluticasone 137-50 MCG/ACT SUSP, 1 spray into each nostril every 12 (twelve) hours, Disp: , Rfl:     Coenzyme Q10 (CoQ-10) 100 MG capsule, Take 100 mg by mouth daily, Disp: , Rfl:     ferrous sulfate 324 (65 Fe) mg, Take 1 tablet (324 mg total) by mouth 2 (two) times a day before meals, Disp: 60 tablet, Rfl: 0    folic acid (FOLVITE) 1 mg tablet, Take 1 tablet (1 mg total) by mouth daily, Disp: 30 tablet, Rfl: 0    gabapentin (NEURONTIN) 300 mg capsule, Take 2 capsules (600 mg total) by mouth 2 (two) times a day, Disp: 120 capsule, Rfl: 0    lisinopril (ZESTRIL) 5 mg tablet, Take 1 tablet (5 mg total) by mouth daily Do not  start before January 5, 2023., Disp: 30 tablet, Rfl: 0    methotrexate (PF) intrathecal injection, 8 mg by Intrathecal route weekly, Disp: , Rfl:     Methotrexate, Anti-Rheumatic, (METHOTREXATE SC), Inject 0.8 mL under the skin every 7 days Instructed by Rheumatology to stop 7 days prior to surgery, Disp: , Rfl:     Multiple Vitamins-Minerals (multivitamin with minerals) tablet, Take 1 tablet by mouth daily, Disp: 30 tablet, Rfl: 0    multivitamin (THERAGRAN) TABS, Take 1 tablet by mouth daily, Disp: , Rfl:     omeprazole (PriLOSEC) 40 MG capsule, Take 40 mg by mouth daily with lunch, Disp: , Rfl:     saccharomyces boulardii (FLORASTOR) 250 mg capsule, Take 250 mg by mouth daily at bedtime, Disp: , Rfl:     Simponi 50 MG/0.5ML SOAJ, Instructed by Rheumatology to stop 1 month prior to surgery, Disp: , Rfl:     acetaminophen (TYLENOL) 325 mg tablet, Take 2 tablets (650 mg total) by mouth every 6 (six) hours as needed for mild pain, Disp: , Rfl:     aspirin 325 mg tablet, Take 325 mg by mouth daily (Patient not taking: Reported on 2/5/2025), Disp: , Rfl:     docusate sodium (COLACE) 100 mg capsule, Take 1 capsule (100 mg total) by mouth 2 (two) times a day, Disp: 60 capsule, Rfl: 0    enoxaparin (LOVENOX) 40 mg/0.4 mL, Inject 0.4 mL (40 mg total) under the skin in the morning for 14 days, Disp: 5.6 mL, Rfl: 0    methocarbamol (ROBAXIN) 500 mg tablet, Take 1 tablet (500 mg total) by mouth 2 (two) times a day as needed for muscle spasms, Disp: 20 tablet, Rfl: 0    naproxen sodium (ALEVE) 220 MG tablet, Take 1 tablet (220 mg total) by mouth 2 (two) times a day with meals, Disp: 60 tablet, Rfl: 0    ALLERGIES:  Allergies   Allergen Reactions    Wasp Venom Anaphylaxis    American Cockroach Other (See Comments)     Allergy test done    Dog Epithelium (Canis Lupus Familiaris) Other (See Comments)     Allergy test was done  Also allergic to cat dander      Rinvoq [Upadacitinib] Dizziness       ROS:  Review of Systems      Constitutional: Negative for fatigue, fever or loss of appetite.   HENT: Negative.    Respiratory: Negative for shortness of breath, dyspnea.    Cardiovascular: Negative for chest pain/tightness.   Gastrointestinal: Negative for abdominal pain, N/V.   Endocrine: Negative for cold/heat intolerance, unexplained weight loss/gain.   Genitourinary: Negative for flank pain, dysuria, hematuria.   Musculoskeletal: Positive for arthralgia   Skin: Negative for rash.    Neurological: Negative for numbness or tingling  Psychiatric/Behavioral: Negative for agitation.  _____________________________________________________  PHYSICAL EXAMINATION:    Height 6' (1.829 m), weight 92.5 kg (204 lb).    Constitutional: Oriented to person, place, and time. Appears well-developed and well-nourished. No distress.   HENT:   Head: Normocephalic.   Eyes: Conjunctivae are normal. Right eye exhibits no discharge. Left eye exhibits no discharge. No scleral icterus.   Cardiovascular: Normal rate.    Pulmonary/Chest: Effort normal.   Neurological: Alert and oriented to person, place, and time.   Skin: Skin is warm and dry. No rash noted. Not diaphoretic. No erythema. No pallor.   Psychiatric: Normal mood and affect. Behavior is normal. Judgment and thought content normal.      MUSCULOSKELETAL EXAMINATION:   Physical Exam  Ortho Exam    Right lower extremity is neurovascular intact  Toes are pink and mobile  Compartments are soft  Good range of motion of the hip  Brisk cap refill  Sensation intact  No warmth erythema  No ecchymosis  Objective:  BP Readings from Last 1 Encounters:   02/05/25 137/81      Wt Readings from Last 1 Encounters:   02/27/25 92.5 kg (204 lb)        BMI:   Estimated body mass index is 27.67 kg/m² as calculated from the following:    Height as of this encounter: 6' (1.829 m).    Weight as of this encounter: 92.5 kg (204 lb).        Scribe Attestation      I,:  Poli Higgins PA-C am acting as a scribe while in the  presence of the attending physician.:       I,:  Alex Flores, DO personally performed the services described in this documentation    as scribed in my presence.:

## 2025-03-25 ENCOUNTER — APPOINTMENT (OUTPATIENT)
Dept: RADIOLOGY | Facility: CLINIC | Age: 65
End: 2025-03-25
Payer: MEDICARE

## 2025-03-25 ENCOUNTER — OFFICE VISIT (OUTPATIENT)
Dept: URGENT CARE | Facility: CLINIC | Age: 65
End: 2025-03-25
Payer: MEDICARE

## 2025-03-25 ENCOUNTER — TELEPHONE (OUTPATIENT)
Age: 65
End: 2025-03-25

## 2025-03-25 VITALS
HEART RATE: 67 BPM | BODY MASS INDEX: 27.96 KG/M2 | TEMPERATURE: 99.2 F | OXYGEN SATURATION: 96 % | HEIGHT: 72 IN | SYSTOLIC BLOOD PRESSURE: 136 MMHG | RESPIRATION RATE: 20 BRPM | WEIGHT: 206.4 LBS | DIASTOLIC BLOOD PRESSURE: 78 MMHG

## 2025-03-25 DIAGNOSIS — K59.00 CONSTIPATION, UNSPECIFIED CONSTIPATION TYPE: Primary | ICD-10-CM

## 2025-03-25 DIAGNOSIS — K59.00 CONSTIPATION, UNSPECIFIED CONSTIPATION TYPE: ICD-10-CM

## 2025-03-25 DIAGNOSIS — M17.11 PRIMARY OSTEOARTHRITIS OF RIGHT KNEE: Primary | ICD-10-CM

## 2025-03-25 PROCEDURE — G0463 HOSPITAL OUTPT CLINIC VISIT: HCPCS

## 2025-03-25 PROCEDURE — 99214 OFFICE O/P EST MOD 30 MIN: CPT

## 2025-03-25 PROCEDURE — 74018 RADEX ABDOMEN 1 VIEW: CPT

## 2025-03-25 NOTE — PATIENT INSTRUCTIONS
Over the counter MiraLax  Magnesium citrate (Citro-Mag)  May try prune juice    Take Senna (Senokot) or Colace twice daily.     Drink plenty of water  Consume recommended amount of dietary fiber (20-35g per day)  May add bulk fiber (Ex: Metamucil or Citrucel if experiencing gas)    We have performed some tests on you during your visit with us. Our office will contact you with these results only if changes need to be made to the care plan previously discussed with you at your visit. You can review your results on [facility]. Please don't hesitate to call if you have any questions regarding your results and/or treatment.     present to the ED with any increased fevers, abdominal pain or vomiting.    If your symptoms do not improve with our current treatment plan or worsen, please schedule an appointment with your PCP. If you develop any high or persistent fevers, chest pain, palpitations, shortness of breath, difficulty swallowing, decreased urine output, abdominal pain, dizziness or any other concerning symptoms, please proceed to the ED.
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
## 2025-03-25 NOTE — TELEPHONE ENCOUNTER
called PT to see what he ment by he wanted 3 shot injections. he wants a vissco shot order so i reported to doctor and told him to keep the f/u just in case he needed a consolt visit first.

## 2025-03-27 ENCOUNTER — APPOINTMENT (OUTPATIENT)
Dept: LAB | Facility: HOSPITAL | Age: 65
End: 2025-03-27
Payer: MEDICARE

## 2025-03-27 DIAGNOSIS — Z51.81 VISIT FOR MONITORING RITUXAN THERAPY: ICD-10-CM

## 2025-03-27 DIAGNOSIS — Z79.620 VISIT FOR MONITORING RITUXAN THERAPY: ICD-10-CM

## 2025-03-27 DIAGNOSIS — M05.79 SEROPOSITIVE RHEUMATOID ARTHRITIS OF MULTIPLE SITES (HCC): ICD-10-CM

## 2025-03-27 LAB
ALBUMIN SERPL BCG-MCNC: 4.3 G/DL (ref 3.5–5)
ALP SERPL-CCNC: 89 U/L (ref 34–104)
ALT SERPL W P-5'-P-CCNC: 17 U/L (ref 7–52)
AST SERPL W P-5'-P-CCNC: 19 U/L (ref 13–39)
BASOPHILS # BLD AUTO: 0.12 THOUSANDS/ÂΜL (ref 0–0.1)
BASOPHILS NFR BLD AUTO: 1 % (ref 0–1)
BILIRUB DIRECT SERPL-MCNC: 0.11 MG/DL (ref 0–0.2)
BILIRUB SERPL-MCNC: 0.58 MG/DL (ref 0.2–1)
BUN SERPL-MCNC: 20 MG/DL (ref 5–25)
CREAT SERPL-MCNC: 0.79 MG/DL (ref 0.6–1.3)
CRP SERPL QL: 24.7 MG/L
EOSINOPHIL # BLD AUTO: 0.22 THOUSAND/ÂΜL (ref 0–0.61)
EOSINOPHIL NFR BLD AUTO: 2 % (ref 0–6)
ERYTHROCYTE [DISTWIDTH] IN BLOOD BY AUTOMATED COUNT: 14.3 % (ref 11.6–15.1)
ERYTHROCYTE [SEDIMENTATION RATE] IN BLOOD: 58 MM/HOUR (ref 0–19)
GFR SERPL CREATININE-BSD FRML MDRD: 94 ML/MIN/1.73SQ M
HCT VFR BLD AUTO: 43.6 % (ref 36.5–49.3)
HGB BLD-MCNC: 14.3 G/DL (ref 12–17)
IMM GRANULOCYTES # BLD AUTO: 0.04 THOUSAND/UL (ref 0–0.2)
IMM GRANULOCYTES NFR BLD AUTO: 0 % (ref 0–2)
LYMPHOCYTES # BLD AUTO: 1.41 THOUSANDS/ÂΜL (ref 0.6–4.47)
LYMPHOCYTES NFR BLD AUTO: 13 % (ref 14–44)
MCH RBC QN AUTO: 31 PG (ref 26.8–34.3)
MCHC RBC AUTO-ENTMCNC: 32.8 G/DL (ref 31.4–37.4)
MCV RBC AUTO: 94 FL (ref 82–98)
MONOCYTES # BLD AUTO: 1.18 THOUSAND/ÂΜL (ref 0.17–1.22)
MONOCYTES NFR BLD AUTO: 11 % (ref 4–12)
NEUTROPHILS # BLD AUTO: 7.77 THOUSANDS/ÂΜL (ref 1.85–7.62)
NEUTS SEG NFR BLD AUTO: 73 % (ref 43–75)
NRBC BLD AUTO-RTO: 0 /100 WBCS
PLATELET # BLD AUTO: 286 THOUSANDS/UL (ref 149–390)
PMV BLD AUTO: 9.2 FL (ref 8.9–12.7)
PROT SERPL-MCNC: 7.2 G/DL (ref 6.4–8.4)
RBC # BLD AUTO: 4.62 MILLION/UL (ref 3.88–5.62)
WBC # BLD AUTO: 10.74 THOUSAND/UL (ref 4.31–10.16)

## 2025-03-27 PROCEDURE — 87350 HEPATITIS BE AG IA: CPT

## 2025-03-27 PROCEDURE — 82565 ASSAY OF CREATININE: CPT

## 2025-03-27 PROCEDURE — 86140 C-REACTIVE PROTEIN: CPT

## 2025-03-27 PROCEDURE — 86705 HEP B CORE ANTIBODY IGM: CPT

## 2025-03-27 PROCEDURE — 85652 RBC SED RATE AUTOMATED: CPT

## 2025-03-27 PROCEDURE — 87340 HEPATITIS B SURFACE AG IA: CPT

## 2025-03-27 PROCEDURE — 86803 HEPATITIS C AB TEST: CPT

## 2025-03-27 PROCEDURE — 85025 COMPLETE CBC W/AUTO DIFF WBC: CPT

## 2025-03-27 PROCEDURE — 84520 ASSAY OF UREA NITROGEN: CPT

## 2025-03-27 PROCEDURE — 80076 HEPATIC FUNCTION PANEL: CPT

## 2025-03-27 PROCEDURE — 36415 COLL VENOUS BLD VENIPUNCTURE: CPT

## 2025-03-28 LAB
HBV CORE IGM SER QL: NORMAL
HBV E AG SERPL QL IA: NORMAL

## 2025-03-30 LAB
Lab: 498
Lab: 8472
Lab: NORMAL
Lab: NORMAL
MISCELLANEOUS LAB TEST RESULT: NORMAL
MISCELLANEOUS LAB TEST RESULT: NORMAL
STONE ANALYSIS-IMP: NORMAL
STONE ANALYSIS-IMP: NORMAL

## 2025-04-10 ENCOUNTER — OFFICE VISIT (OUTPATIENT)
Dept: OBGYN CLINIC | Facility: CLINIC | Age: 65
End: 2025-04-10
Payer: MEDICARE

## 2025-04-10 ENCOUNTER — APPOINTMENT (OUTPATIENT)
Dept: RADIOLOGY | Facility: MEDICAL CENTER | Age: 65
End: 2025-04-10
Payer: MEDICARE

## 2025-04-10 VITALS — WEIGHT: 206 LBS | BODY MASS INDEX: 27.9 KG/M2 | HEIGHT: 72 IN

## 2025-04-10 DIAGNOSIS — Z96.641 STATUS POST TOTAL REPLACEMENT OF RIGHT HIP: Primary | ICD-10-CM

## 2025-04-10 DIAGNOSIS — Z96.641 STATUS POST TOTAL REPLACEMENT OF RIGHT HIP: ICD-10-CM

## 2025-04-10 PROCEDURE — 99213 OFFICE O/P EST LOW 20 MIN: CPT | Performed by: ORTHOPAEDIC SURGERY

## 2025-04-10 PROCEDURE — 73502 X-RAY EXAM HIP UNI 2-3 VIEWS: CPT

## 2025-04-10 NOTE — PROGRESS NOTES
:  Assessment & Plan  Status post total replacement of right hip  Reviewed physical exam and imaging with patient at time of visit. Radiographic findings demonstrate appropriate alignment of right total hip arthroplasty with no evidence of loosening or failure of hardware. Patient was advised that he may discontinue formal hip precautions however he should be mindful of his movements as he is still at risk for dislocation. Patient will follow-up in the office in 6 months for re-evaluation with repeat XR right hip. The patient expresses understanding and is in agreement with today's treatment plan.       Orders:    XR hip/pelv 2-3 vws right if performed; Future        Return in about 6 months (around 10/10/2025) for Recheck, Repeat X-ray right hip.        The patient is doing quite well in regards to his right total hip replacement.  Strength and motion intact.  Incision clean and dry.  Leg lengths intact.  Continue home exercise program.  Return back 6 months for evaluation with new x-rays of right hip-2 views  _____________________________________________________  CHIEF COMPLAINT:  Chief Complaint   Patient presents with    Right Hip - Follow-up         SUBJECTIVE:  MIRTA Baldwin is a 64 y.o. male who presents to our office for a postop visit.  The patient is status post right total hip replacement from 10/30/2024.  Overall, he is pleased with the results of surgery.  He denies any pain/discomfort. He states that he has returned to work as of last visit on 2/27/25 without limitations or restrictions.  He denies any numbness or tingling.  He denies any fever or chills.    The following portions of the patient's history were reviewed and updated as appropriate: allergies, current medications, past family history, past medical history, past social history, past surgical history and problem list.    PAST MEDICAL HISTORY:  Past Medical History:   Diagnosis Date    Arthritis 2003    Bladder cancer ([facility])     BPH (benign  prostatic hyperplasia)     Cancer ([facility]) 2011    Bladder cancer    COPD (chronic obstructive pulmonary disease) ([facility]) 06/2020    Obstructive sleep apnoea    CPAP (continuous positive airway pressure) dependence     GERD (gastroesophageal reflux disease)     HL (hearing loss)     Hypertension     Nasal congestion     Neuropathy     Pneumonia 12/22/2022    Rheumatoid arthritis ([facility])     Sleep apnea     Sleep difficulties     Tinnitus     Umbilical hernia        PAST SURGICAL HISTORY:  Past Surgical History:   Procedure Laterality Date    COLONOSCOPY      CYSTOSCOPY      with bladder biopsy and resection of large bladder tumor- Dr. Castro     CYSTOSCOPY  11/09/2012, 08/03/2012, 06/04/2013, 12/21/2015, 6/23/2016, 1/20/2017, 2/9/2018    Diagnostic- Dr. Castro     ESOPHAGOGASTRODUODENOSCOPY      KNEE SURGERY Right     TN ARTHRP ACETBLR/PROX FEM PROSTC AGRFT/ALGRFT Right 10/30/2024    Procedure: ARTHROPLASTY HIP TOTAL;  Surgeon: Alex Flores DO;  [facility];  Service: Orthopedics    TN ARTHRS KNE SURG W/MENISCECTOMY MED/LAT W/SHVG Left 10/19/2016    Procedure: KNEE ARTHROSCOPIC PARTIAL MEDIAL MENISCECTOMY ;  Surgeon: Juni Shelley MD;  [facility];  Service: Orthopedics    TN CORRECTION HAMMERTOE Left 04/01/2022    Procedure: REPAIR HAMMERTOE RIGHT 3RD TOE;  Surgeon: Abraham Perkins DPM;  Location:  MAIN OR;  Service: Podiatry    TN EXC B9 LESION MRGN XCP SK TG T/A/L 2.1-3.0 CM Bilateral 11/01/2018    Procedure: LEFT PROXIMAL FOREARM EXCISIONAL BIOPSY OF SUBCUTANEOUS SOFT TISSUE MASS; BILATERAL OLECRANON BURSECTOMY;  Surgeon: Juni Shelley MD;  [facility];  Service: Orthopedics    TN EXC NEUROMA CUTAN NRV SURGLY IDENTIFIABLE Left 04/01/2022    Procedure: EXCISION NEUROMA RIGHT 2ND INTERSPACE;  Surgeon: Abraham Perkins DPM;  Location:  MAIN OR;  Service: Podiatry    TN TRURL ELECTROSURG RESCJ PROSTATE BLEED COMPLETE N/A 10/05/2018    Procedure: TRANSURETHRAL RESECTION OF PROSTATE (TURP);  Surgeon:  Thierno Castro MD;  [facility];  Service: Urology    PROSTATE SURGERY      UMBILICAL HERNIA REPAIR      WISDOM TOOTH EXTRACTION         FAMILY HISTORY:  Family History   Problem Relation Age of Onset    Heart disease Mother     Diabetes Father     Gout Brother     Heart disease Brother     Cancer Paternal Grandfather         Lung, bone marrow cancer    Cancer Maternal Uncle         Lung cancer ( mesothelioma), according to cousin       SOCIAL HISTORY:  Social History     Tobacco Use    Smoking status: Former     Current packs/day: 0.00     Average packs/day: 1 pack/day for 35.0 years (35.0 ttl pk-yrs)     Types: Cigarettes     Start date:      Quit date: 2012     Years since quittin.2    Smokeless tobacco: Never   Vaping Use    Vaping status: Never Used   Substance Use Topics    Alcohol use: Not Currently     Alcohol/week: 3.0 standard drinks of alcohol     Types: 1 Glasses of wine, 1 Cans of beer, 1 Standard drinks or equivalent per week     Comment: beer while watching sports, wine sometimes with dinner    Drug use: No       MEDICATIONS:    Current Outpatient Medications:     amLODIPine (NORVASC) 10 mg tablet, Take 10 mg by mouth daily in the early morning  , Disp: , Rfl:     ascorbic acid (VITAMIN C) 500 MG tablet, Take 1 tablet (500 mg total) by mouth 2 (two) times a day, Disp: 60 tablet, Rfl: 0    Coenzyme Q10 (CoQ-10) 100 MG capsule, Take 100 mg by mouth daily, Disp: , Rfl:     folic acid (FOLVITE) 1 mg tablet, Take 1 tablet (1 mg total) by mouth daily, Disp: 30 tablet, Rfl: 0    gabapentin (NEURONTIN) 300 mg capsule, Take 2 capsules (600 mg total) by mouth 2 (two) times a day, Disp: 120 capsule, Rfl: 0    lisinopril (ZESTRIL) 5 mg tablet, Take 1 tablet (5 mg total) by mouth daily Do not start before 2023., Disp: 30 tablet, Rfl: 0    methotrexate (PF) intrathecal injection, 8 mg by Intrathecal route weekly, Disp: , Rfl:     Multiple Vitamins-Minerals (multivitamin with minerals)  tablet, Take 1 tablet by mouth daily, Disp: 30 tablet, Rfl: 0    mupirocin (BACTROBAN) 2 % ointment, Apply topically 3 (three) times a day, Disp: 30 g, Rfl: 1    omeprazole (PriLOSEC) 40 MG capsule, Take 40 mg by mouth daily with lunch, Disp: , Rfl:     saccharomyces boulardii (FLORASTOR) 250 mg capsule, Take 250 mg by mouth daily at bedtime, Disp: , Rfl:     acetaminophen (TYLENOL) 325 mg tablet, Take 2 tablets (650 mg total) by mouth every 6 (six) hours as needed for mild pain, Disp: , Rfl:     aspirin 325 mg tablet, Take 325 mg by mouth daily (Patient not taking: Reported on 2/5/2025), Disp: , Rfl:     Azelastine-Fluticasone 137-50 MCG/ACT SUSP, 1 spray into each nostril every 12 (twelve) hours, Disp: , Rfl:     docusate sodium (COLACE) 100 mg capsule, Take 1 capsule (100 mg total) by mouth 2 (two) times a day, Disp: 60 capsule, Rfl: 0    enoxaparin (LOVENOX) 40 mg/0.4 mL, Inject 0.4 mL (40 mg total) under the skin in the morning for 14 days, Disp: 5.6 mL, Rfl: 0    ferrous sulfate 324 (65 Fe) mg, Take 1 tablet (324 mg total) by mouth 2 (two) times a day before meals, Disp: 60 tablet, Rfl: 0    methocarbamol (ROBAXIN) 500 mg tablet, Take 1 tablet (500 mg total) by mouth 2 (two) times a day as needed for muscle spasms, Disp: 20 tablet, Rfl: 0    Methotrexate, Anti-Rheumatic, (METHOTREXATE SC), Inject 0.8 mL under the skin every 7 days Instructed by Rheumatology to stop 7 days prior to surgery, Disp: , Rfl:     multivitamin (THERAGRAN) TABS, Take 1 tablet by mouth daily, Disp: , Rfl:     naproxen sodium (ALEVE) 220 MG tablet, Take 1 tablet (220 mg total) by mouth 2 (two) times a day with meals, Disp: 60 tablet, Rfl: 0    Simponi 50 MG/0.5ML SOAJ, Instructed by Rheumatology to stop 1 month prior to surgery, Disp: , Rfl:     ALLERGIES:  Allergies   Allergen Reactions    Wasp Venom Anaphylaxis    American Cockroach Other (See Comments)     Allergy test done    Dog Epithelium (Canis Lupus Familiaris) Other (See  Comments)     Allergy test was done  Also allergic to cat dander      Rinvoq [Upadacitinib] Dizziness       ROS:  Review of Systems     Constitutional: Negative for fatigue, fever or loss of appetite.   HENT: Negative.    Respiratory: Negative for shortness of breath, dyspnea.    Cardiovascular: Negative for chest pain/tightness.   Gastrointestinal: Negative for abdominal pain, N/V.   Endocrine: Negative for cold/heat intolerance, unexplained weight loss/gain.   Genitourinary: Negative for flank pain, dysuria, hematuria.   Musculoskeletal: Positive for arthralgia   Skin: Negative for rash.    Neurological: Negative for numbness or tingling  Psychiatric/Behavioral: Negative for agitation.  _____________________________________________________  PHYSICAL EXAMINATION:    Height 6' (1.829 m), weight 93.4 kg (206 lb).    Constitutional: Oriented to person, place, and time. Appears well-developed and well-nourished. No distress.   HENT:   Head: Normocephalic.   Eyes: Conjunctivae are normal. Right eye exhibits no discharge. Left eye exhibits no discharge. No scleral icterus.   Cardiovascular: Normal rate.    Pulmonary/Chest: Effort normal.   Neurological: Alert and oriented to person, place, and time.   Skin: Skin is warm and dry. No rash noted. Not diaphoretic. No erythema. No pallor.   Psychiatric: Normal mood and affect. Behavior is normal. Judgment and thought content normal.      MUSCULOSKELETAL EXAMINATION:   Physical Exam  Constitutional:       Appearance: Normal appearance. He is normal weight.   HENT:      Nose: Nose normal.      Mouth/Throat:      Mouth: Mucous membranes are moist.      Pharynx: Oropharynx is clear.   Eyes:      Conjunctiva/sclera: Conjunctivae normal.   Cardiovascular:      Rate and Rhythm: Normal rate.      Pulses: Normal pulses.   Pulmonary:      Effort: Pulmonary effort is normal.   Musculoskeletal:         General: Normal range of motion.      Cervical back: Normal range of motion.   Skin:      General: Skin is warm and dry.      Capillary Refill: Capillary refill takes less than 2 seconds.   Neurological:      General: No focal deficit present.      Mental Status: He is alert and oriented to person, place, and time. Mental status is at baseline.   Psychiatric:         Mood and Affect: Mood normal.         Behavior: Behavior normal.         Thought Content: Thought content normal.         Judgment: Judgment normal.       Ortho Exam    Right lower extremity is neurovascular intact  Toes are pink and mobile  Compartments are soft  Good range of motion of the hip  Brisk cap refill  Sensation intact  No warmth erythema  No ecchymosis    Objective:  BP Readings from Last 1 Encounters:   03/25/25 136/78      Wt Readings from Last 1 Encounters:   04/10/25 93.4 kg (206 lb)        BMI:   Estimated body mass index is 27.94 kg/m² as calculated from the following:    Height as of this encounter: 6' (1.829 m).    Weight as of this encounter: 93.4 kg (206 lb).        Scribe Attestation      I,:  Violeta Larios am acting as a scribe while in the presence of the attending physician.:       I,:  Alex Flores DO personally performed the services described in this documentation    as scribed in my presence.:

## 2025-04-17 ENCOUNTER — PROCEDURE VISIT (OUTPATIENT)
Dept: OBGYN CLINIC | Facility: CLINIC | Age: 65
End: 2025-04-17
Payer: MEDICARE

## 2025-04-17 VITALS — BODY MASS INDEX: 27.09 KG/M2 | HEIGHT: 72 IN | WEIGHT: 200 LBS

## 2025-04-17 DIAGNOSIS — M17.11 PRIMARY OSTEOARTHRITIS OF RIGHT KNEE: Primary | ICD-10-CM

## 2025-04-17 PROCEDURE — 20610 DRAIN/INJ JOINT/BURSA W/O US: CPT | Performed by: STUDENT IN AN ORGANIZED HEALTH CARE EDUCATION/TRAINING PROGRAM

## 2025-04-17 NOTE — PROGRESS NOTES
Orthopedic Visco Injection Note    :  Assessment & Plan  Primary osteoarthritis of right knee    Orders:    Large joint arthrocentesis: R knee    64 y.o. male with right knee osteoarthritis   Continue with hyaluronic acid injections q 6 months PRN  Follow up in 1 week for injection # 2/3 in the series  Patient can get corticosteroid injections q 3 months PRN  Continue with activities as tolerated  Advised to ice tonight and tomorrow PRN  Follow up in 3 months for re-evaluation and to order repeat visco series, if indicated     Subjective:   64 y.o. male presenting to the office for right knee visco injection # 1/3 for chronic right knee osteoarthritis. Patient did not have problems with their last injection. Patient states that today the pain is 6 and has improved from their last injection. Patient does not have any locking, giving out, or instability. Patient does not have any symptoms of an infection. Patient denies any other acute or associated complaints.    Repeat series- Response to Prior series:  good  What are the current symptoms?  crepitus sensation, pain located globally, stiffness, and swelling  What is the patient's pain level (1-10)   6    ROS  Review of systems: ROS is negative other than that noted in the HPI.  Constitutional: Negative for fatigue and fever.     Past Medical History:   Diagnosis Date    Arthritis 2003    Bladder cancer (HCC)     BPH (benign prostatic hyperplasia)     Cancer (HCC) 2011    Bladder cancer    COPD (chronic obstructive pulmonary disease) ([facility]) 06/2020    Obstructive sleep apnoea    CPAP (continuous positive airway pressure) dependence     GERD (gastroesophageal reflux disease)     HL (hearing loss)     Hypertension     Nasal congestion     Neuropathy     Pneumonia 12/22/2022    Rheumatoid arthritis (HCC)     Sleep apnea     Sleep difficulties     Tinnitus     Umbilical hernia      Past Surgical History:   Procedure Laterality Date    COLONOSCOPY      CYSTOSCOPY      with  bladder biopsy and resection of large bladder tumor- Dr. Castro     CYSTOSCOPY  11/09/2012, 08/03/2012, 06/04/2013, 12/21/2015, 6/23/2016, 1/20/2017, 2/9/2018    Diagnostic- Dr. Castro     ESOPHAGOGASTRODUODENOSCOPY      KNEE SURGERY Right     MO ARTHRP ACETBLR/PROX FEM PROSTC AGRFT/ALGRFT Right 10/30/2024    Procedure: ARTHROPLASTY HIP TOTAL;  Surgeon: Alex Flores DO;  [facility];  Service: Orthopedics    MO ARTHRS KNE SURG W/MENISCECTOMY MED/LAT W/SHVG Left 10/19/2016    Procedure: KNEE ARTHROSCOPIC PARTIAL MEDIAL MENISCECTOMY ;  Surgeon: Juni Shelley MD;  [facility];  Service: Orthopedics    MO CORRECTION HAMMERTOE Left 04/01/2022    Procedure: REPAIR HAMMERTOE RIGHT 3RD TOE;  Surgeon: Abraham Perkins DPM;  Location:  MAIN OR;  Service: Podiatry    MO EXC B9 LESION MRGN XCP SK TG T/A/L 2.1-3.0 CM Bilateral 11/01/2018    Procedure: LEFT PROXIMAL FOREARM EXCISIONAL BIOPSY OF SUBCUTANEOUS SOFT TISSUE MASS; BILATERAL OLECRANON BURSECTOMY;  Surgeon: Juni Shelley MD;  [facility];  Service: Orthopedics    MO EXC NEUROMA CUTAN NRV SURGLY IDENTIFIABLE Left 04/01/2022    Procedure: EXCISION NEUROMA RIGHT 2ND INTERSPACE;  Surgeon: Abraham Perkins DPM;  Location:  MAIN OR;  Service: Podiatry    MO TRURL ELECTROSURG RESCJ PROSTATE BLEED COMPLETE N/A 10/05/2018    Procedure: TRANSURETHRAL RESECTION OF PROSTATE (TURP);  Surgeon: Thierno Castro MD;  [facility];  Service: Urology    PROSTATE SURGERY      UMBILICAL HERNIA REPAIR      WISDOM TOOTH EXTRACTION       Results Reviewed       None            right knee(s) -   Patient ambulates with antalgic gait pattern  Uses Single Point Cane assistive device  No anatomical deformity  Skin is warm and dry to touch with no signs of erythema, ecchymosis, or infection   No soft tissue swelling or effusion noted  Strength: 5/5 throughout  TTP over medial joint line, TTP over lateral joint line, no popliteal fullness appreciated on exam   Flexor and  extensor mechanisms are intact   Knee is stable to varus and valgus stress  Patella tracks centrally with palpable crepitus  Calf compartments are soft and supple  Intact distal pulses with brisk capillary refill to the toes  Sensation light touch intact distally    Procedures  Large joint arthrocentesis: R knee  Universal Protocol:  Consent: Verbal consent obtained.  Consent given by: patient  Patient understanding: patient states understanding of the procedure being performed  Site marked: the operative site was marked  Radiology Images displayed and confirmed. If images not available, report reviewed: imaging studies available  Patient identity confirmed: verbally with patient  Supporting Documentation  Indications: pain   Procedure Details  Location: knee - R knee  Needle size: 22 G (21 G)  Ultrasound guidance: no  Approach: lateral  Medications administered: 16 mg hylan 16 MG/2ML    Patient tolerance: patient tolerated the procedure well with no immediate complications  Dressing:  Sterile dressing applied            Scribe Attestation      I,:   am acting as a scribe while in the presence of the attending physician.:       I,:   personally performed the services described in this documentation    as scribed in my presence.:

## 2025-04-23 ENCOUNTER — TELEPHONE (OUTPATIENT)
Age: 65
End: 2025-04-23

## 2025-04-23 NOTE — TELEPHONE ENCOUNTER
Caller: Self    Doctor: Dr. Flores    Reason for call: Patient scheduled to have dental cleaning next week. Can antibiotic order be placed at   [facility] [address] [place] [address] SRIDHAR STEVENSON [[id number]]      Please let patient know when order is in    Call back#: [phone]

## 2025-04-24 ENCOUNTER — PROCEDURE VISIT (OUTPATIENT)
Dept: OBGYN CLINIC | Facility: CLINIC | Age: 65
End: 2025-04-24
Payer: MEDICARE

## 2025-04-24 VITALS — BODY MASS INDEX: 27.09 KG/M2 | HEIGHT: 72 IN | WEIGHT: 200 LBS

## 2025-04-24 DIAGNOSIS — Z96.641 STATUS POST TOTAL REPLACEMENT OF RIGHT HIP: Primary | ICD-10-CM

## 2025-04-24 DIAGNOSIS — M17.11 PRIMARY OSTEOARTHRITIS OF RIGHT KNEE: Primary | ICD-10-CM

## 2025-04-24 PROCEDURE — 20610 DRAIN/INJ JOINT/BURSA W/O US: CPT | Performed by: STUDENT IN AN ORGANIZED HEALTH CARE EDUCATION/TRAINING PROGRAM

## 2025-04-24 RX ORDER — CEPHALEXIN 500 MG/1
2000 CAPSULE ORAL ONCE
Qty: 4 CAPSULE | Refills: 3 | Status: SHIPPED | OUTPATIENT
Start: 2025-04-24 | End: 2025-04-24

## 2025-04-24 NOTE — PROGRESS NOTES
Orthopedic Visco Injection Note    :  Assessment & Plan  Primary osteoarthritis of right knee    Orders:    Large joint arthrocentesis: R knee    64 y.o. male with right knee osteoarthritis   Continue with hyaluronic acid injections q 6 months PRN  Follow up in 1 week for injection # 3/3 in the series  Patient can get corticosteroid injections q 3 months PRN  Continue with activities as tolerated  Advised to ice tonight and tomorrow PRN  Follow up in 3 months for re-evaluation and to order repeat visco series, if indicated     Subjective:   64 y.o. male presenting to the office for right knee visco injection # 2/3 for chronic right knee osteoarthritis. Patient did not have problems with their last injection. Patient states that today the pain is 3 and has improved from their last injection. Patient does not have any locking, giving out, or instability. Patient does not have any symptoms of an infection. Patient denies any other acute or associated complaints.    Repeat series- Response to Prior series:  good  What are the current symptoms?  crepitus sensation, pain located globally, stiffness, and swelling  What is the patient's pain level (1-10)   3    ROS  Review of systems: ROS is negative other than that noted in the HPI.  Constitutional: Negative for fatigue and fever.     Past Medical History:   Diagnosis Date    Arthritis 2003    Bladder cancer (HCC)     BPH (benign prostatic hyperplasia)     Cancer ([facility]) 2011    Bladder cancer    COPD (chronic obstructive pulmonary disease) ([facility]) 06/2020    Obstructive sleep apnoea    CPAP (continuous positive airway pressure) dependence     GERD (gastroesophageal reflux disease)     HL (hearing loss)     Hypertension     Nasal congestion     Neuropathy     Pneumonia 12/22/2022    Rheumatoid arthritis (HCC)     Sleep apnea     Sleep difficulties     Tinnitus     Umbilical hernia      Past Surgical History:   Procedure Laterality Date    COLONOSCOPY      CYSTOSCOPY      with  bladder biopsy and resection of large bladder tumor- Dr. Castro     CYSTOSCOPY  11/09/2012, 08/03/2012, 06/04/2013, 12/21/2015, 6/23/2016, 1/20/2017, 2/9/2018    Diagnostic- Dr. Castro     ESOPHAGOGASTRODUODENOSCOPY      KNEE SURGERY Right     NE ARTHRP ACETBLR/PROX FEM PROSTC AGRFT/ALGRFT Right 10/30/2024    Procedure: ARTHROPLASTY HIP TOTAL;  Surgeon: Alex Flores DO;  [facility];  Service: Orthopedics    NE ARTHRS KNE SURG W/MENISCECTOMY MED/LAT W/SHVG Left 10/19/2016    Procedure: KNEE ARTHROSCOPIC PARTIAL MEDIAL MENISCECTOMY ;  Surgeon: Juni Shelley MD;  [facility];  Service: Orthopedics    NE CORRECTION HAMMERTOE Left 04/01/2022    Procedure: REPAIR HAMMERTOE RIGHT 3RD TOE;  Surgeon: Abraham Perkins DPM;  Location:  MAIN OR;  Service: Podiatry    NE EXC B9 LESION MRGN XCP SK TG T/A/L 2.1-3.0 CM Bilateral 11/01/2018    Procedure: LEFT PROXIMAL FOREARM EXCISIONAL BIOPSY OF SUBCUTANEOUS SOFT TISSUE MASS; BILATERAL OLECRANON BURSECTOMY;  Surgeon: Juni Shelley MD;  [facility];  Service: Orthopedics    NE EXC NEUROMA CUTAN NRV SURGLY IDENTIFIABLE Left 04/01/2022    Procedure: EXCISION NEUROMA RIGHT 2ND INTERSPACE;  Surgeon: Abraham Perkins DPM;  Location:  MAIN OR;  Service: Podiatry    NE TRURL ELECTROSURG RESCJ PROSTATE BLEED COMPLETE N/A 10/05/2018    Procedure: TRANSURETHRAL RESECTION OF PROSTATE (TURP);  Surgeon: Thierno Castro MD;  [facility];  Service: Urology    PROSTATE SURGERY      UMBILICAL HERNIA REPAIR      WISDOM TOOTH EXTRACTION       Results Reviewed       None            right knee(s) -   Patient ambulates with antalgic gait pattern  Uses Single Point Cane assistive device  No anatomical deformity  Skin is warm and dry to touch with no signs of erythema, ecchymosis, or infection   No soft tissue swelling or effusion noted  Strength: 5/5 throughout  TTP over medial joint line, TTP over lateral joint line, no popliteal fullness appreciated on exam   Flexor and  extensor mechanisms are intact   Knee is stable to varus and valgus stress  Patella tracks centrally with palpable crepitus  Calf compartments are soft and supple  Intact distal pulses with brisk capillary refill to the toes  Sensation light touch intact distally    Procedures  Large joint arthrocentesis: R knee  Universal Protocol:  Consent: Verbal consent obtained.  Consent given by: patient  Patient understanding: patient states understanding of the procedure being performed  Site marked: the operative site was marked  Radiology Images displayed and confirmed. If images not available, report reviewed: imaging studies available  Patient identity confirmed: verbally with patient  Supporting Documentation  Indications: pain     Is this a Visco injection? Yes  Non-Pharmacologic Treatments Attempted: Home Exercise and Physical Therapy  Pharmacologic Treatments Attempted: NSAIDs, corticosteroid injection  Pain Score: 3Procedure Details  Location: knee - R knee  Preparation: Patient was prepped and draped in the usual sterile fashion  Needle size: 22 G (21 G)  Ultrasound guidance: no  Approach: lateral  Medications administered: 16 mg hylan 16 MG/2ML    Patient tolerance: patient tolerated the procedure well with no immediate complications  Dressing:  Sterile dressing applied            Scribe Attestation      I,:   am acting as a scribe while in the presence of the attending physician.:       I,:   personally performed the services described in this documentation    as scribed in my presence.:

## 2025-05-01 ENCOUNTER — PROCEDURE VISIT (OUTPATIENT)
Dept: OBGYN CLINIC | Facility: CLINIC | Age: 65
End: 2025-05-01
Payer: MEDICARE

## 2025-05-01 VITALS — HEIGHT: 72 IN | BODY MASS INDEX: 27.09 KG/M2 | WEIGHT: 200 LBS

## 2025-05-01 DIAGNOSIS — M17.11 PRIMARY OSTEOARTHRITIS OF RIGHT KNEE: Primary | ICD-10-CM

## 2025-05-01 PROCEDURE — 20610 DRAIN/INJ JOINT/BURSA W/O US: CPT

## 2025-05-01 NOTE — PROGRESS NOTES
Orthopedic Visco Injection Note    :  Assessment & Plan  Primary osteoarthritis of right knee    Orders:    Large joint arthrocentesis: R knee    64 y.o. male with right knee osteoarthritis   Continue with hyaluronic acid injections q 6 months PRN  Patient can get corticosteroid injections q 3 months PRN  Continue with activities as tolerated  Advised to ice tonight and tomorrow PRN  Follow up in 3 months for re-evaluation and to order repeat visco series, if indicated     Subjective:   64 y.o. male presenting to the office for right knee visco injection # 3/3 for chronic right knee osteoarthritis. Patient did not have problems with their last injection. Patient states that today the pain is 4 and has improved from their last injection. Patient does not have any locking, giving out, or instability. Patient does not have any symptoms of an infection. Patient denies any other acute or associated complaints.    Repeat series- Response to Prior series:  good  What are the current symptoms?  crepitus sensation, pain located globally, stiffness, and swelling  What is the patient's pain level (1-10)   3    ROS  Review of systems: ROS is negative other than that noted in the HPI.  Constitutional: Negative for fatigue and fever.     Past Medical History:   Diagnosis Date    Arthritis 2003    Bladder cancer (HCC)     BPH (benign prostatic hyperplasia)     Cancer (HCC) 2011    Bladder cancer    COPD (chronic obstructive pulmonary disease) (HCC) 06/2020    Obstructive sleep apnoea    CPAP (continuous positive airway pressure) dependence     GERD (gastroesophageal reflux disease)     HL (hearing loss)     Hypertension     Nasal congestion     Neuropathy     Pneumonia 12/22/2022    Rheumatoid arthritis (HCC)     Sleep apnea     Sleep difficulties     Tinnitus     Umbilical hernia      Past Surgical History:   Procedure Laterality Date    COLONOSCOPY      CYSTOSCOPY      with bladder biopsy and resection of large bladder tumor-  Dr. Castro     CYSTOSCOPY  11/09/2012, 08/03/2012, 06/04/2013, 12/21/2015, 6/23/2016, 1/20/2017, 2/9/2018    Diagnostic- Dr. Castro     ESOPHAGOGASTRODUODENOSCOPY      KNEE SURGERY Right     SD ARTHRP ACETBLR/PROX FEM PROSTC AGRFT/ALGRFT Right 10/30/2024    Procedure: ARTHROPLASTY HIP TOTAL;  Surgeon: Alex Flores DO;  [facility];  Service: Orthopedics    SD ARTHRS KNE SURG W/MENISCECTOMY MED/LAT W/SHVG Left 10/19/2016    Procedure: KNEE ARTHROSCOPIC PARTIAL MEDIAL MENISCECTOMY ;  Surgeon: Juni Shelley MD;  [facility];  Service: Orthopedics    SD CORRECTION HAMMERTOE Left 04/01/2022    Procedure: REPAIR HAMMERTOE RIGHT 3RD TOE;  Surgeon: Abraham Perkins DPM;  Location:  MAIN OR;  Service: Podiatry    SD EXC B9 LESION MRGN XCP SK TG T/A/L 2.1-3.0 CM Bilateral 11/01/2018    Procedure: LEFT PROXIMAL FOREARM EXCISIONAL BIOPSY OF SUBCUTANEOUS SOFT TISSUE MASS; BILATERAL OLECRANON BURSECTOMY;  Surgeon: Juni Shelley MD;  [facility];  Service: Orthopedics    SD EXC NEUROMA CUTAN NRV SURGLY IDENTIFIABLE Left 04/01/2022    Procedure: EXCISION NEUROMA RIGHT 2ND INTERSPACE;  Surgeon: Abraham Perkins DPM;  Location:  MAIN OR;  Service: Podiatry    SD TRURL ELECTROSURG RESCJ PROSTATE BLEED COMPLETE N/A 10/05/2018    Procedure: TRANSURETHRAL RESECTION OF PROSTATE (TURP);  Surgeon: Thierno Castro MD;  [facility];  Service: Urology    PROSTATE SURGERY      UMBILICAL HERNIA REPAIR      WISDOM TOOTH EXTRACTION       Results Reviewed       None            right knee(s) -   Patient ambulates with antalgic gait pattern  Uses Single Point Cane assistive device  No anatomical deformity  Skin is warm and dry to touch with no signs of erythema, ecchymosis, or infection   No soft tissue swelling or effusion noted  Strength: 5/5 throughout  TTP over medial joint line, TTP over lateral joint line, no popliteal fullness appreciated on exam   Flexor and extensor mechanisms are intact   Knee is stable to varus  and valgus stress  Patella tracks centrally with palpable crepitus  Calf compartments are soft and supple  Intact distal pulses with brisk capillary refill to the toes  Sensation light touch intact distally    Procedures  Large joint arthrocentesis: R knee  Universal Protocol:  Consent: Verbal consent obtained.  Consent given by: patient  Patient understanding: patient states understanding of the procedure being performed  Site marked: the operative site was marked  Radiology Images displayed and confirmed. If images not available, report reviewed: imaging studies available  Patient identity confirmed: verbally with patient  Supporting Documentation  Indications: pain     Is this a Visco injection? Yes  Non-Pharmacologic Treatments Attempted: Home Exercise and Physical Therapy  Pharmacologic Treatments Attempted: NSAIDs, corticosteroid injection  Pain Score: 3Procedure Details  Location: knee - R knee  Preparation: Patient was prepped and draped in the usual sterile fashion  Needle size: 22 G (21 G)  Ultrasound guidance: no  Approach: lateral  Medications administered: 16 mg hylan 16 MG/2ML    Patient tolerance: patient tolerated the procedure well with no immediate complications  Dressing:  Sterile dressing applied            Scribe Attestation      I,:   am acting as a scribe while in the presence of the attending physician.:       I,:   personally performed the services described in this documentation    as scribed in my presence.:

## 2025-06-16 ENCOUNTER — APPOINTMENT (OUTPATIENT)
Dept: LAB | Facility: HOSPITAL | Age: 65
End: 2025-06-16
Payer: MEDICARE

## 2025-06-16 DIAGNOSIS — Z79.620 VISIT FOR MONITORING XOLAIR THERAPY: ICD-10-CM

## 2025-06-16 DIAGNOSIS — Z79.899 ENCOUNTER FOR LONG-TERM (CURRENT) USE OF MEDICATIONS: ICD-10-CM

## 2025-06-16 DIAGNOSIS — Z51.81 VISIT FOR MONITORING XOLAIR THERAPY: ICD-10-CM

## 2025-06-16 DIAGNOSIS — M05.79 SEROPOSITIVE RHEUMATOID ARTHRITIS OF MULTIPLE SITES (HCC): ICD-10-CM

## 2025-06-16 LAB
ALBUMIN SERPL BCG-MCNC: 4.3 G/DL (ref 3.5–5)
ALP SERPL-CCNC: 95 U/L (ref 34–104)
ALT SERPL W P-5'-P-CCNC: 17 U/L (ref 7–52)
ANION GAP SERPL CALCULATED.3IONS-SCNC: 9 MMOL/L (ref 4–13)
AST SERPL W P-5'-P-CCNC: 16 U/L (ref 13–39)
BASOPHILS # BLD AUTO: 0.16 THOUSANDS/ÂΜL (ref 0–0.1)
BASOPHILS NFR BLD AUTO: 1 % (ref 0–1)
BILIRUB DIRECT SERPL-MCNC: 0.06 MG/DL (ref 0–0.2)
BILIRUB SERPL-MCNC: 0.44 MG/DL (ref 0.2–1)
BUN SERPL-MCNC: 22 MG/DL (ref 5–25)
CALCIUM SERPL-MCNC: 9.4 MG/DL (ref 8.4–10.2)
CHLORIDE SERPL-SCNC: 100 MMOL/L (ref 96–108)
CO2 SERPL-SCNC: 27 MMOL/L (ref 21–32)
CREAT SERPL-MCNC: 0.82 MG/DL (ref 0.6–1.3)
CRP SERPL QL: 2.9 MG/L
EOSINOPHIL # BLD AUTO: 0.28 THOUSAND/ÂΜL (ref 0–0.61)
EOSINOPHIL NFR BLD AUTO: 3 % (ref 0–6)
ERYTHROCYTE [DISTWIDTH] IN BLOOD BY AUTOMATED COUNT: 14.3 % (ref 11.6–15.1)
ERYTHROCYTE [SEDIMENTATION RATE] IN BLOOD: 33 MM/HOUR (ref 0–19)
GFR SERPL CREATININE-BSD FRML MDRD: 92 ML/MIN/1.73SQ M
GLUCOSE P FAST SERPL-MCNC: 96 MG/DL (ref 65–99)
HCT VFR BLD AUTO: 44.2 % (ref 36.5–49.3)
HGB BLD-MCNC: 14.5 G/DL (ref 12–17)
IMM GRANULOCYTES # BLD AUTO: 0.07 THOUSAND/UL (ref 0–0.2)
IMM GRANULOCYTES NFR BLD AUTO: 1 % (ref 0–2)
LYMPHOCYTES # BLD AUTO: 2.89 THOUSANDS/ÂΜL (ref 0.6–4.47)
LYMPHOCYTES NFR BLD AUTO: 25 % (ref 14–44)
MCH RBC QN AUTO: 31.4 PG (ref 26.8–34.3)
MCHC RBC AUTO-ENTMCNC: 32.8 G/DL (ref 31.4–37.4)
MCV RBC AUTO: 96 FL (ref 82–98)
MONOCYTES # BLD AUTO: 1.12 THOUSAND/ÂΜL (ref 0.17–1.22)
MONOCYTES NFR BLD AUTO: 10 % (ref 4–12)
NEUTROPHILS # BLD AUTO: 6.87 THOUSANDS/ÂΜL (ref 1.85–7.62)
NEUTS SEG NFR BLD AUTO: 60 % (ref 43–75)
NRBC BLD AUTO-RTO: 0 /100 WBCS
PLATELET # BLD AUTO: 337 THOUSANDS/UL (ref 149–390)
PMV BLD AUTO: 8.9 FL (ref 8.9–12.7)
POTASSIUM SERPL-SCNC: 3.7 MMOL/L (ref 3.5–5.3)
PROT SERPL-MCNC: 7.3 G/DL (ref 6.4–8.4)
RBC # BLD AUTO: 4.62 MILLION/UL (ref 3.88–5.62)
SODIUM SERPL-SCNC: 136 MMOL/L (ref 135–147)
WBC # BLD AUTO: 11.39 THOUSAND/UL (ref 4.31–10.16)

## 2025-06-16 PROCEDURE — 80048 BASIC METABOLIC PNL TOTAL CA: CPT

## 2025-06-16 PROCEDURE — 80076 HEPATIC FUNCTION PANEL: CPT

## 2025-06-16 PROCEDURE — 85652 RBC SED RATE AUTOMATED: CPT

## 2025-06-16 PROCEDURE — 36415 COLL VENOUS BLD VENIPUNCTURE: CPT

## 2025-06-16 PROCEDURE — 86140 C-REACTIVE PROTEIN: CPT

## 2025-06-16 PROCEDURE — 86480 TB TEST CELL IMMUN MEASURE: CPT

## 2025-06-16 PROCEDURE — 85025 COMPLETE CBC W/AUTO DIFF WBC: CPT

## 2025-06-17 LAB
GAMMA INTERFERON BACKGROUND BLD IA-ACNC: 0.04 IU/ML
M TB IFN-G BLD-IMP: NEGATIVE
M TB IFN-G CD4+ BCKGRND COR BLD-ACNC: 0.01 IU/ML
M TB IFN-G CD4+ BCKGRND COR BLD-ACNC: 0.02 IU/ML
MITOGEN IGNF BCKGRD COR BLD-ACNC: 1.66 IU/ML